# Patient Record
Sex: FEMALE | Race: WHITE | NOT HISPANIC OR LATINO | Employment: UNEMPLOYED | URBAN - METROPOLITAN AREA
[De-identification: names, ages, dates, MRNs, and addresses within clinical notes are randomized per-mention and may not be internally consistent; named-entity substitution may affect disease eponyms.]

---

## 2023-10-03 ENCOUNTER — OFFICE VISIT (OUTPATIENT)
Dept: FAMILY MEDICINE | Facility: CLINIC | Age: 33
End: 2023-10-03

## 2023-10-03 VITALS
RESPIRATION RATE: 20 BRPM | BODY MASS INDEX: 29.02 KG/M2 | WEIGHT: 170 LBS | HEART RATE: 73 BPM | DIASTOLIC BLOOD PRESSURE: 99 MMHG | HEIGHT: 64 IN | TEMPERATURE: 97.6 F | SYSTOLIC BLOOD PRESSURE: 140 MMHG

## 2023-10-03 DIAGNOSIS — Z01.818 PRE-OPERATIVE EXAMINATION: Primary | ICD-10-CM

## 2023-10-03 DIAGNOSIS — Z76.89 HEALTH CARE HOME: ICD-10-CM

## 2023-10-03 DIAGNOSIS — Z71.89 ACP (ADVANCE CARE PLANNING): ICD-10-CM

## 2023-10-03 LAB
% GRANULOCYTES: 74.6 %
HCT VFR BLD AUTO: 46.2 % (ref 35–47)
HEMOGLOBIN: 15.1 G/DL (ref 11.7–15.7)
LYMPHOCYTES NFR BLD AUTO: 18.3 %
MCH RBC QN AUTO: 32.3 PG (ref 26–33)
MCHC RBC AUTO-ENTMCNC: 32.7 G/DL (ref 31–36)
MCV RBC AUTO: 98.7 FL (ref 78–100)
MONOCYTES NFR BLD AUTO: 7.1 %
PLATELET COUNT - QUEST: 329 10^9/L (ref 150–375)
RBC # BLD AUTO: 4.68 10*12/L (ref 3.8–5.2)
WBC # BLD AUTO: 11.2 10*9/L (ref 4–11)

## 2023-10-03 PROCEDURE — 36415 COLL VENOUS BLD VENIPUNCTURE: CPT

## 2023-10-03 PROCEDURE — 85025 COMPLETE CBC W/AUTO DIFF WBC: CPT

## 2023-10-03 PROCEDURE — 80053 COMPREHEN METABOLIC PANEL: CPT

## 2023-10-03 PROCEDURE — 99204 OFFICE O/P NEW MOD 45 MIN: CPT

## 2023-10-03 NOTE — LETTER
10/3/2023        RE: Hetal Poole  [address]  [facility] [address]        [facility]  [address]  [address]  [facility] [address]  Phone: [phone]  Fax: [phone]  Primary Provider: Rachid Aleman  Pre-op Performing Provider: RACHID ALEMAN      PREOPERATIVE EVALUATION:  Today's date: 10/3/2023    Hetal is a 32 year old female who presents for a preoperative evaluation.    Surgical Information:  Surgery/Procedure: Right foot bunion surgery   Surgery Location: [facility] surgery center   Surgeon: Dr. Sow  Surgery Date: 10/19/23  Time of Surgery: TBD  Where patient plans to recover: At home with family  Fax number for surgical facility: [phone]    Assessment & Plan  The proposed surgical procedure is considered INTERMEDIATE risk.    (Z01.818) Pre-operative examination  (primary encounter diagnosis)  Comment: Elevated blood pressure at today's visit, patient states she is typically nervous when she gets her blood pressure checked. She does not have a history of hypertension. CBC and CMP are both within normal limits. She is cleared for surgery. She will follow up for a physical after her surgery.  Plan: VENOUS COLLECTION, HEMOGRAM PLATELET DIFF         (BFP), Comprehensive Metobolic Panel (BFP)    Antiplatelet or Anticoagulation Medication Instructions:   -Patient is on no antiplatelet or anticoagulation medications. Patient was instructed to stop all NSAIDs (Aspirin, Aleve, etc) one week prior to surgery.      Additional Medication Instructions:  -Patient is on no additional chronic medications     RECOMMENDATION:  -APPROVAL GIVEN to proceed with proposed procedure.       Subjective    Hetal has had a bunion on her right foot for the past three years that has been bothering her more. She is in pain and wants to get it removed.     She does not have a history of any health problems, other than she had high blood pressure with both of her pregnancies. She  does not take any daily medications other than a multivitamin. She does not take any Aspirin or blood thinners.     She recently came here in April from [place], and will be establishing care with us here.     Visit was done with help of [place] , Janie.     HPI related to upcoming procedure:     1. No - Have you ever had a heart attack or stroke?  2. No - Have you ever had surgery on your heart or blood vessels, such as a stent, coronary (heart) bypass, or surgery on an artery in the head, neck, heart, or legs?  3. No - Do you have chest pain when you are physically active?  4. No - Do you have a history of heart failure?  5. No - Do you currently have a cold, bronchitis, or symptoms of other respiratory (head and chest) infections?   6. No - Do you have a cough, shortness of breath, or wheezing?  7. Yes - Do you or anyone in your family have a history of blood clots? Dad had a history of one blood clot that was treated. She is unsure of where the clot was.  8. No - Do you or anyone in your family have a serious bleeding problem, such as long-lasting bleeding after surgeries or cuts?  9. No - Have you ever had anemia or been told to take iron pills?   10. No - Have you had any abnormal blood loss such as black, tarry or bloody stools, or abnormal vaginal bleeding?   11. No - Have you ever had a blood transfusion?  12. Yes - Are you willing to have a blood transfusion if it is medically needed before, during, or after your surgery?  13. No - Have you or anyone in your family ever had problems with anesthesia (sedation for surgery)?  14. No - Do you have sleep apnea, excessive snoring, or daytime drowsiness?   15. No - Do you have any artifical heart valves or other implanted medical devices, such as a pacemaker, defibrillator, or continuous glucose monitor?  16. No - Do you have any artifical joints?  17. No - Are you allergic to latex?  18. No - Is there any chance that you may be pregnant? LMP was on  09/15/2023.    Health Care Directive:  Patient does not have a Health Care Directive or Living Will: Discussed advance care planning with patient; however, patient declined at this time.    Preoperative Review of :   reviewed - no record of controlled substances prescribed.      Review of Systems  CONSTITUTIONAL: NEGATIVE for fever, chills, change in weight  INTEGUMENTARY/SKIN: NEGATIVE for worrisome rashes, moles or lesions  EYES: NEGATIVE for vision changes or irritation  ENT/MOUTH: NEGATIVE for ear, mouth and throat problems  RESP: NEGATIVE for significant cough or SOB  CV: NEGATIVE for chest pain, palpitations or peripheral edema  GI: NEGATIVE for nausea, abdominal pain, heartburn, or change in bowel habits  : NEGATIVE for frequency, dysuria, or hematuria  MUSCULOSKELETAL: NEGATIVE for significant arthralgias or myalgia  NEURO: NEGATIVE for weakness, dizziness or paresthesias  ENDOCRINE: NEGATIVE for temperature intolerance, skin/hair changes  HEME: NEGATIVE for bleeding problems  PSYCHIATRIC: NEGATIVE for changes in mood or affect    Patient Active Problem List    Diagnosis Date Noted     ACP (advance care planning) 10/03/2023     Priority: Medium     Health Care Home 10/03/2023     Priority: Medium      History reviewed. No pertinent past medical history.  Past Surgical History:   Procedure Laterality Date      SECTION  03/15/2016    Had elevated blood pressure during her pregnancy      SECTION N/A 2019    Had elevated blood pressure during her pregnancy     No current outpatient medications on file.       No Known Allergies     Social History     Tobacco Use     Smoking status: Never     Smokeless tobacco: Never   Substance Use Topics     Alcohol use: Not Currently     No family history on file.  History   Drug Use Not on file         Objective    BP (!) 140/99 (BP Location: Left arm, Patient Position: Sitting, Cuff Size: Adult Large)   Pulse 73   Temp 97.6  F (36.4  C)  "(Temporal)   Resp 20   Ht 1.626 m (5' 4\")   Wt 77.1 kg (170 lb)   LMP 09/15/2023 (Exact Date)   BMI 29.18 kg/m      Physical Exam  GENERAL APPEARANCE: healthy, alert and no distress.  EYES: EOMI, PERRL.  HENT: Ear canals and TM's normal and nose and mouth without ulcers or lesions.  NECK: No adenopathy, no asymmetry, masses, or scars and thyroid normal to palpation.  RESP: Lungs clear to auscultation - no rales, rhonchi or wheezes.  CV: Regular rates and rhythm, normal S1 S2, no S3 or S4 and no murmur, click or rub.  ABDOMEN:  Soft, nontender, no HSM or masses and bowel sounds normal.  MS: Extremities normal- no gross deformities noted, no evidence of inflammation in joints, FROM in all extremities.  SKIN: No suspicious lesions or rashes  NEURO: Normal strength and tone, sensory exam grossly normal, mentation intact and speech normal  PSYCH: Mentation appears normal. and affect normal/bright  LYMPHATICS: No cervical adenopathy    Diagnostics:  Recent Results (from the past 24 hour(s))   HEMOGRAM PLATELET DIFF (BFP)    Collection Time: 10/03/23 12:07 PM   Result Value Ref Range    WBC 11.2 (A) 4.0 - 11 10*9/L    RBC Count 4.68 3.8 - 5.2 10*12/L    Hemoglobin 15.1 11.7 - 15.7 g/dL    Hematocrit 46.2 35.0 - 47.0 %    MCV 98.7 78 - 100 fL    MCH 32.3 26 - 33 pg    MCHC 32.7 31 - 36 g/dL    Platelet Count 329 150 - 375 10^9/L    % Granulocytes 74.6 %    % Lymphocytes 18.3 %    % Monocytes 7.1 %      CMP reviewed and is within normal limits.     No EKG required, no history of coronary heart disease, significant arrhythmia, peripheral arterial disease or other structural heart disease.    Revised Cardiac Risk Index (RCRI):  The patient has the following serious cardiovascular risks for perioperative complications:   - No serious cardiac risks = 0 points     RCRI Interpretation: 0 points: Class I (very low risk - 0.4% complication rate)       Signed Electronically by: Cindy Aleman PA-C  Copy of this evaluation " report is provided to requesting physician.            Sincerely,        Cindy Aleman PA-C

## 2023-10-03 NOTE — NURSING NOTE
Chief Complaint   Patient presents with    New Patient     New patient to this clinic     Pre-Op Exam     DOS 10/19/23, gardenia surgery of right foot being done by Dr. Sow with [facility], surgery being held at [facility] surgery [place]

## 2023-10-03 NOTE — PROGRESS NOTES
[facility]  [address]  [address]  [facility] [address]  Phone: [phone]  Fax: [phone]  Primary Provider: Rachid Aleman  Pre-op Performing Provider: RACHID ALEMAN      PREOPERATIVE EVALUATION:  Today's date: 10/3/2023    Hetal is a 32 year old female who presents for a preoperative evaluation.    Surgical Information:  Surgery/Procedure: Right foot bunion surgery   Surgery Location: [facility] surgery center   Surgeon: Dr. Sow  Surgery Date: 10/19/23  Time of Surgery: TBD  Where patient plans to recover: At home with family  Fax number for surgical facility: [phone]    Assessment & Plan  The proposed surgical procedure is considered INTERMEDIATE risk.    (Z01.818) Pre-operative examination  (primary encounter diagnosis)  Comment: Elevated blood pressure at today's visit, patient states she is typically nervous when she gets her blood pressure checked. She does not have a history of hypertension. CBC and CMP are both within normal limits. She is cleared for surgery. She will follow up for a physical after her surgery.  Plan: VENOUS COLLECTION, HEMOGRAM PLATELET DIFF         (BFP), Comprehensive Metobolic Panel (BFP)    Antiplatelet or Anticoagulation Medication Instructions:   -Patient is on no antiplatelet or anticoagulation medications. Patient was instructed to stop all NSAIDs (Aspirin, Aleve, etc) one week prior to surgery.      Additional Medication Instructions:  -Patient is on no additional chronic medications     RECOMMENDATION:  -APPROVAL GIVEN to proceed with proposed procedure.       Subjective     Hetal has had a bunion on her right foot for the past three years that has been bothering her more. She is in pain and wants to get it removed.     She does not have a history of any health problems, other than she had high blood pressure with both of her pregnancies. She does not take any daily medications other than a multivitamin. She does not take any  Aspirin or blood thinners.     She recently came here in April from [place], and will be establishing care with us here.     Visit was done with help of [place] , Janie.     HPI related to upcoming procedure:     1. No - Have you ever had a heart attack or stroke?  2. No - Have you ever had surgery on your heart or blood vessels, such as a stent, coronary (heart) bypass, or surgery on an artery in the head, neck, heart, or legs?  3. No - Do you have chest pain when you are physically active?  4. No - Do you have a history of heart failure?  5. No - Do you currently have a cold, bronchitis, or symptoms of other respiratory (head and chest) infections?   6. No - Do you have a cough, shortness of breath, or wheezing?  7. Yes - Do you or anyone in your family have a history of blood clots? Dad had a history of one blood clot that was treated. She is unsure of where the clot was.  8. No - Do you or anyone in your family have a serious bleeding problem, such as long-lasting bleeding after surgeries or cuts?  9. No - Have you ever had anemia or been told to take iron pills?   10. No - Have you had any abnormal blood loss such as black, tarry or bloody stools, or abnormal vaginal bleeding?   11. No - Have you ever had a blood transfusion?  12. Yes - Are you willing to have a blood transfusion if it is medically needed before, during, or after your surgery?  13. No - Have you or anyone in your family ever had problems with anesthesia (sedation for surgery)?  14. No - Do you have sleep apnea, excessive snoring, or daytime drowsiness?   15. No - Do you have any artifical heart valves or other implanted medical devices, such as a pacemaker, defibrillator, or continuous glucose monitor?  16. No - Do you have any artifical joints?  17. No - Are you allergic to latex?  18. No - Is there any chance that you may be pregnant? LMP was on 09/15/2023.    Health Care Directive:  Patient does not have a Health Care Directive  "or Living Will: Discussed advance care planning with patient; however, patient declined at this time.    Preoperative Review of :   reviewed - no record of controlled substances prescribed.      Review of Systems  CONSTITUTIONAL: NEGATIVE for fever, chills, change in weight  INTEGUMENTARY/SKIN: NEGATIVE for worrisome rashes, moles or lesions  EYES: NEGATIVE for vision changes or irritation  ENT/MOUTH: NEGATIVE for ear, mouth and throat problems  RESP: NEGATIVE for significant cough or SOB  CV: NEGATIVE for chest pain, palpitations or peripheral edema  GI: NEGATIVE for nausea, abdominal pain, heartburn, or change in bowel habits  : NEGATIVE for frequency, dysuria, or hematuria  MUSCULOSKELETAL: NEGATIVE for significant arthralgias or myalgia  NEURO: NEGATIVE for weakness, dizziness or paresthesias  ENDOCRINE: NEGATIVE for temperature intolerance, skin/hair changes  HEME: NEGATIVE for bleeding problems  PSYCHIATRIC: NEGATIVE for changes in mood or affect    Patient Active Problem List    Diagnosis Date Noted    ACP (advance care planning) 10/03/2023     Priority: Medium    Health Care Home 10/03/2023     Priority: Medium      History reviewed. No pertinent past medical history.  Past Surgical History:   Procedure Laterality Date     SECTION  03/15/2016    Had elevated blood pressure during her pregnancy     SECTION N/A 2019    Had elevated blood pressure during her pregnancy     No current outpatient medications on file.       No Known Allergies     Social History     Tobacco Use    Smoking status: Never    Smokeless tobacco: Never   Substance Use Topics    Alcohol use: Not Currently     No family history on file.  History   Drug Use Not on file         Objective     BP (!) 140/99 (BP Location: Left arm, Patient Position: Sitting, Cuff Size: Adult Large)   Pulse 73   Temp 97.6  F (36.4  C) (Temporal)   Resp 20   Ht 1.626 m (5' 4\")   Wt 77.1 kg (170 lb)   LMP 09/15/2023 (Exact Date)  "  BMI 29.18 kg/m      Physical Exam  GENERAL APPEARANCE: healthy, alert and no distress.  EYES: EOMI, PERRL.  HENT: Ear canals and TM's normal and nose and mouth without ulcers or lesions.  NECK: No adenopathy, no asymmetry, masses, or scars and thyroid normal to palpation.  RESP: Lungs clear to auscultation - no rales, rhonchi or wheezes.  CV: Regular rates and rhythm, normal S1 S2, no S3 or S4 and no murmur, click or rub.  ABDOMEN:  Soft, nontender, no HSM or masses and bowel sounds normal.  MS: Extremities normal- no gross deformities noted, no evidence of inflammation in joints, FROM in all extremities.  SKIN: No suspicious lesions or rashes  NEURO: Normal strength and tone, sensory exam grossly normal, mentation intact and speech normal  PSYCH: Mentation appears normal. and affect normal/bright  LYMPHATICS: No cervical adenopathy    Diagnostics:  Recent Results (from the past 24 hour(s))   HEMOGRAM PLATELET DIFF (BFP)    Collection Time: 10/03/23 12:07 PM   Result Value Ref Range    WBC 11.2 (A) 4.0 - 11 10*9/L    RBC Count 4.68 3.8 - 5.2 10*12/L    Hemoglobin 15.1 11.7 - 15.7 g/dL    Hematocrit 46.2 35.0 - 47.0 %    MCV 98.7 78 - 100 fL    MCH 32.3 26 - 33 pg    MCHC 32.7 31 - 36 g/dL    Platelet Count 329 150 - 375 10^9/L    % Granulocytes 74.6 %    % Lymphocytes 18.3 %    % Monocytes 7.1 %      CMP reviewed and is within normal limits.     No EKG required, no history of coronary heart disease, significant arrhythmia, peripheral arterial disease or other structural heart disease.    Revised Cardiac Risk Index (RCRI):  The patient has the following serious cardiovascular risks for perioperative complications:   - No serious cardiac risks = 0 points     RCRI Interpretation: 0 points: Class I (very low risk - 0.4% complication rate)       Signed Electronically by: Cindy Aleman PA-C  Copy of this evaluation report is provided to requesting physician.

## 2023-10-04 LAB
ALBUMIN SERPL-MCNC: 4.7 G/DL (ref 3.6–5.1)
ALBUMIN/GLOB SERPL: 1.5 {RATIO} (ref 1–2.5)
ALP SERPL-CCNC: 51 U/L (ref 33–130)
ALT 1742-6: 10 U/L (ref 0–32)
AST 1920-8: 11 U/L (ref 0–35)
BILIRUB SERPL-MCNC: 0.8 MG/DL (ref 0.2–1.2)
BUN SERPL-MCNC: 11 MG/DL (ref 7–25)
BUN/CREATININE RATIO: 13.6 (ref 6–32)
CALCIUM SERPL-MCNC: 10.2 MG/DL (ref 8.6–10.3)
CHLORIDE SERPLBLD-SCNC: 102.1 MMOL/L (ref 98–110)
CO2 SERPL-SCNC: 29.4 MMOL/L (ref 20–32)
CREAT SERPL-MCNC: 0.81 MG/DL (ref 0.6–1.3)
GLOBULIN, CALCULATED - QUEST: 3.2 (ref 1.9–3.7)
GLUCOSE SERPL-MCNC: 86 MG/DL (ref 60–99)
POTASSIUM SERPL-SCNC: 4.85 MMOL/L (ref 3.5–5.3)
PROT SERPL-MCNC: 7.9 G/DL (ref 6.1–8.1)
SODIUM SERPL-SCNC: 138.5 MMOL/L (ref 135–146)

## 2023-11-16 ENCOUNTER — OFFICE VISIT (OUTPATIENT)
Dept: FAMILY MEDICINE | Facility: CLINIC | Age: 33
End: 2023-11-16

## 2023-11-16 VITALS
SYSTOLIC BLOOD PRESSURE: 144 MMHG | BODY MASS INDEX: 29.02 KG/M2 | DIASTOLIC BLOOD PRESSURE: 98 MMHG | HEIGHT: 64 IN | TEMPERATURE: 98.3 F | WEIGHT: 170 LBS | OXYGEN SATURATION: 97 % | HEART RATE: 93 BPM

## 2023-11-16 DIAGNOSIS — I10 ESSENTIAL HYPERTENSION, BENIGN: Primary | ICD-10-CM

## 2023-11-16 DIAGNOSIS — R00.2 HEART PALPITATIONS: ICD-10-CM

## 2023-11-16 DIAGNOSIS — Z87.59 HISTORY OF PRE-ECLAMPSIA: ICD-10-CM

## 2023-11-16 PROCEDURE — 99213 OFFICE O/P EST LOW 20 MIN: CPT

## 2023-11-16 RX ORDER — LOSARTAN POTASSIUM AND HYDROCHLOROTHIAZIDE 12.5; 5 MG/1; MG/1
1 TABLET ORAL DAILY
Qty: 30 TABLET | Refills: 0 | Status: SHIPPED | OUTPATIENT
Start: 2023-11-16 | End: 2024-01-04

## 2023-11-16 RX ORDER — AMLODIPINE BESYLATE 5 MG/1
5 TABLET ORAL DAILY
COMMUNITY
End: 2023-11-16

## 2023-11-16 RX ORDER — ENALAPRIL MALEATE 10 MG/1
10 TABLET ORAL DAILY
COMMUNITY
End: 2023-11-16

## 2023-11-16 NOTE — NURSING NOTE
"Chief Complaint   Patient presents with    Hypertension     Recheck BP, has been checking BP at home and it has been up to 160/110's, when BP is high she feels \"flush\"     Pre-visit Screening:  Immunizations:  not up to date- tdap unknown  Colonoscopy:  NA  Mammogram: NA  Asthma Action Test/Plan:  NA  PHQ9:  NA  GAD7:  NA  Questioned patient about current smoking habits Pt. has never smoked.  Ok to leave detailed message on voice mail for today's visit only Yes, phone # [phone]    "

## 2023-11-16 NOTE — PROGRESS NOTES
"   SUBJECTIVE:   Hetal is a 33 year old, presenting for the following:  No chief complaint on file.      HPI    Hetal presents for a yearly physical. She does not have any concerns.     Past medical history and daily medications reviewed. Reviewed past medical history, surgical history, family history, and social history below.     Social history:  -Diet:    -Water:   -Exercise:   -Sleep:   -Daily vitamins:   -Dentist:   -Eye doctor:   -Smoking:   -Alcohol:   -LMP:   -Sexual activity:      Screenings:  -Immunizations: Due for flu and TDAP. Encouraged COVID booster from local pharmacy.   -Lab work: CBC, CMP, TSH, T4, lipid, hep C screening, HIV  -Pap smear: Due.  -Mammogram: Starting at age 40.     {Add if <65 person on Medicare  - Required Questions (Optional):693913}  {Outside tests to abstract? (Optional):831832}    {additional problems to add (Optional):393982}      Social History     Tobacco Use     Smoking status: Never     Smokeless tobacco: Never   Substance Use Topics     Alcohol use: Not Currently     {Rooming staff  Click this link to complete the Prescreen if response below is not for today's visit  Alcohol Use Prescreen >3 drinks/day or > 7 drinks/week.  If the prescreen question answer is YES, complete the full AUDIT  :066038}         No data to display            {add AUDIT responses (Optional) (A score of 7 for adult men is an indication of hazardous drinking; a score of 8 or more is an indication of an alcohol use disorder.  A score of 7 or more for adult women is an indication of hazardous drinking or an alchohol use disorder):698348}  Reviewed orders with patient.  Reviewed health maintenance and updated orders accordingly - { :740270::\"Yes\"}  {Chronicprobdata (optional):050440}    Breast Cancer Screening:    FHS-7:        No data to display              {If any of the questions to the BCRA (FHS-7) are answered yes, consider ordering referral for genetic counseling (Optional) :371223::\"click " "delete button to remove this line now\"}  {AMB Mammogram Decision Support (Optional) :091153}  Pertinent mammograms are reviewed under the imaging tab.    History of abnormal Pap smear: { :297926}     Reviewed and updated as needed this visit by clinical staff                  Reviewed and updated as needed this visit by Provider                 {HISTORY OPTIONS (Optional):938391}    Review of Systems  {FEMALE ROS (Optional):650034}     OBJECTIVE:   LMP 09/15/2023 (Exact Date)   Physical Exam  {Exam Choices (Optional):091966}    {Diagnostic Test Results (Optional):196437::\"Diagnostic Test Results:\",\"Labs reviewed in Epic\"}    ASSESSMENT/PLAN:   {Diag Picklist:273909}    {Patient advised of split billing (Optional):557942}      COUNSELING:  {FEMALE COUNSELING MESSAGES:354527::\"Reviewed preventive health counseling, as reflected in patient instructions\"}      BMI:   Estimated body mass index is 29.18 kg/m  as calculated from the following:    Height as of 10/3/23: 1.626 m (5' 4\").    Weight as of 10/3/23: 77.1 kg (170 lb).   {Weight Management Plan needed for ACO:670266}      She reports that she has never smoked. She has never used smokeless tobacco.      {Counseling Resources  [place] Preventive Services Task Force  Cholesterol Screening  Health diet/nutrition  Pooled Cohorts Equation Calculator  USDA's MyPlate  ASA Prophylaxis  Lung CA Screening  Osteoporosis prevention/bone health :040096}  {Breast Cancer Risk Calculator  BRCA-Related Cancer Risk Assessment FHS-7 Tool :390723}  Cindy Aleman PA-C  [facility]  "

## 2023-11-16 NOTE — PROGRESS NOTES
"Assessment & Plan     1. Essential hypertension, benign  - Will plan on her stopping the Enalapril and Amlodipine since no benefit and I am not quite sure if these are the same ingredients as the prescription strength. We will have her try Hyzaar 50-12.5 mg daily daily for the next two weeks and follow up in clinic for an office visit. She was instructed to check her blood pressure daily for the next two weeks and keep a log of these values. Side effects discussed. Continue to follow a low salt diet and focus on healthy eating habits and daily exercise. Return to clinic and ER precautions discussed.   - losartan-hydrochlorothiazide (HYZAAR) 50-12.5 MG tablet; Take 1 tablet by mouth daily  Dispense: 30 tablet; Refill: 0    2. Heart palpitations  - Will plan on ordering a zio patch and thyroid lab work at her next visit as this needs to be worked up however would like to focus on getting better control of her blood pressure.     3. History of pre-eclampsia    Follow up in 2-4 weeks to recheck blood pressure. Reasons to follow-up sooner or seek emergent care reviewed.     Cindy Aleman PA-C  [facility]       Subjective     Hetal Poole is a 33 year old female who presents to clinic today for the following health issues:    HPI   Chief Complaint   Patient presents with     Hypertension     Recheck BP, has been checking BP at home and it has been up to 160/110's, when BP is high she feels \"flush\"      Hetal presents for blood pressure concerns. Notes she has had a history of elevated blood pressure since both of her pregnancies (, 2019). She notes she had to get a  with both of those pregnancies due to her blood pressure. She recently moved here from [place] in April and over there was started on Enalapril 10 mg in the AM and Amlodipine 5 mg in the evenings. She notes she took these medications on a as needed basis when she felt her blood pressure was high. Since moving here, she has " "been taking these medications daily and has been picking them up without a prescription from the [facility] however she notes they are not helping. She has been checking her blood pressure a couple of times a week and is getting values in the 160's/110's. When this happens she also notes her face is flushed and warm and she gets headaches. She denies any chest pain, shortness of breath, dizziness, or swelling in her legs.     She also gets intermittent palpitations but does not check her blood pressure when she gets these. She has had an ultrasound of her thyroid in the past but it did not show any abnormalities.     All other ROS are negative unless otherwise noted above.     An  (Janie) was present during today's visit.         Objective    BP (!) 144/98 (BP Location: Right arm, Patient Position: Sitting, Cuff Size: Adult Large)   Pulse 93   Temp 98.3  F (36.8  C) (Temporal)   Ht 1.626 m (5' 4\")   Wt 77.1 kg (170 lb)   LMP 09/15/2023 (Exact Date)   SpO2 97%   BMI 29.18 kg/m    Body mass index is 29.18 kg/m .    Physical Exam:  GENERAL: healthy, alert and no distress  EYES: Eyes grossly normal to inspection, PERRL and conjunctivae and sclerae normal  MOUTH: without ulcers or lesions  NECK: no adenopathy, no asymmetry, masses, or scars and thyroid normal to palpation  RESP: lungs clear to auscultation - no rales, rhonchi or wheezes  CV: regular rate and rhythm, normal S1 S2, no S3 or S4, no murmur, click or rub, no peripheral edema and peripheral pulses strong  ABDOMEN: soft, nontender, no hepatosplenomegaly, no masses and bowel sounds normal  MS: no gross musculoskeletal defects noted, no edema  SKIN: no suspicious lesions or rashes  NEURO: Normal strength and tone, mentation intact and speech normal  PSYCH: mentation appears normal, affect normal/bright  "

## 2024-01-04 ENCOUNTER — OFFICE VISIT (OUTPATIENT)
Dept: FAMILY MEDICINE | Facility: CLINIC | Age: 34
End: 2024-01-04

## 2024-01-04 VITALS
OXYGEN SATURATION: 97 % | DIASTOLIC BLOOD PRESSURE: 92 MMHG | SYSTOLIC BLOOD PRESSURE: 138 MMHG | BODY MASS INDEX: 28.34 KG/M2 | HEIGHT: 64 IN | WEIGHT: 166 LBS | TEMPERATURE: 97.9 F | HEART RATE: 67 BPM

## 2024-01-04 DIAGNOSIS — Z01.818 PRE-OPERATIVE EXAMINATION: Primary | ICD-10-CM

## 2024-01-04 DIAGNOSIS — M20.12 HALLUX VALGUS (ACQUIRED), LEFT FOOT: ICD-10-CM

## 2024-01-04 DIAGNOSIS — I10 ESSENTIAL HYPERTENSION, BENIGN: ICD-10-CM

## 2024-01-04 LAB
ALBUMIN SERPL-MCNC: 4.3 G/DL (ref 3.6–5.1)
ALBUMIN/GLOB SERPL: 1.2 {RATIO} (ref 1–2.5)
ALP SERPL-CCNC: 59 U/L (ref 33–130)
ALT 1742-6: 12 U/L (ref 0–32)
AST 1920-8: 11 U/L (ref 0–35)
BILIRUB SERPL-MCNC: 0.9 MG/DL (ref 0.2–1.2)
BUN SERPL-MCNC: 9 MG/DL (ref 7–25)
BUN/CREATININE RATIO: 11.5 (ref 6–32)
CALCIUM SERPL-MCNC: 9.4 MG/DL (ref 8.6–10.3)
CHLORIDE SERPLBLD-SCNC: 104.5 MMOL/L (ref 98–110)
CO2 SERPL-SCNC: 30.6 MMOL/L (ref 20–32)
CREAT SERPL-MCNC: 0.78 MG/DL (ref 0.6–1.3)
ERYTHROCYTE [DISTWIDTH] IN BLOOD BY AUTOMATED COUNT: 12.3 %
GLOBULIN, CALCULATED - QUEST: 3.2 (ref 1.9–3.7)
GLUCOSE SERPL-MCNC: 89 MG/DL (ref 60–99)
HCT VFR BLD AUTO: 42 % (ref 35–47)
HEMOGLOBIN: 13.9 G/DL (ref 11.7–15.7)
MCH RBC QN AUTO: 31.8 PG (ref 26–33)
MCHC RBC AUTO-ENTMCNC: 33.1 G/DL (ref 31–36)
MCV RBC AUTO: 96.2 FL (ref 78–100)
PLATELET COUNT - QUEST: 411 10^9/L (ref 150–375)
POTASSIUM SERPL-SCNC: 4.62 MMOL/L (ref 3.5–5.3)
PROT SERPL-MCNC: 7.8 G/DL (ref 6.1–8.1)
RBC # BLD AUTO: 4.37 10*12/L (ref 3.8–5.2)
SODIUM SERPL-SCNC: 140.8 MMOL/L (ref 135–146)
WBC # BLD AUTO: 9.4 10*9/L (ref 4–11)

## 2024-01-04 PROCEDURE — 99214 OFFICE O/P EST MOD 30 MIN: CPT

## 2024-01-04 PROCEDURE — 36415 COLL VENOUS BLD VENIPUNCTURE: CPT

## 2024-01-04 PROCEDURE — 85027 COMPLETE CBC AUTOMATED: CPT

## 2024-01-04 PROCEDURE — 80053 COMPREHEN METABOLIC PANEL: CPT

## 2024-01-04 RX ORDER — LOSARTAN POTASSIUM AND HYDROCHLOROTHIAZIDE 12.5; 5 MG/1; MG/1
1 TABLET ORAL DAILY
Qty: 40 TABLET | Refills: 0 | Status: SHIPPED | OUTPATIENT
Start: 2024-01-04 | End: 2024-02-27 | Stop reason: DRUGHIGH

## 2024-01-04 NOTE — PROGRESS NOTES
[facility] PHYSIC[facility]ANS  [address]  [address]  [facility] [address]  Phone: [phone]  Fax: [phone]  Primary Provider: Rachid Aleman  Pre-op Performing Provider: RACHID ALEMAN      PREOPERATIVE EVALUATION:  Today's date: 2024    Hetal is a 33 year old ( 89), presenting for the following:  Pre-Op Exam (Surgery/Procedure: Left foot bunion surgery/[place] [facility] [place]Surgeon: Dr. Sow/Surgery Date: 24)      Surgical Information:  Surgery/Procedure: Left foot bunion surgery  Sur[place] Location: [facility] [place]   Surgeon: Dr. Sow  Surgery Date: 24  Time of Surgery: AM  Where patient plans to recover: At home with family  Fax number for surgical facility: [phone]     Assessment & Plan     The proposed surgical procedure is considered INTERMEDIATE risk.     Pre-operative examination  - Patient is cleared for surgery. CBC and CMP are within normal limits.  - VENOUS COLLECTION  - Hemogram Platelet (BFP)  - Comprehensive Metobolic Panel (BFP)    Essential hypertension, benign  - Elevated in clinic today, but well controlled per patient. Patient will follow up for her blood pressure after the surgery.  - VENOUS COLLECTION  - Comprehensive Metobolic Panel (BFP)    Hallux valgus (acquired), left foot    Antiplatelet or Anticoagulation Medication Instructions:   -Patient is on no antiplatelet or anticoagulation medications.      Additional Medication Instructions:  -Patient was instructed to stop taking any vitamins and NSAIDS (Ibuprofen, Aleve, Aspirin) starting today, one week before the procedure. She was also instructed to not have anything to eat or drink for 12 hours before the surgery other than small sips of water. She will skip her Losartan-Hydrochlorothiazide the morning of surgery and resume this after the procedure.     RECOMMENDATION:  -APPROVAL GIVEN to proceed with proposed procedure.    Subjective     Hetal presents for a preoperative examination for  left bunion surgery on 01/11/24 with Dr. Sow at [facility].      She has a history of high blood pressure and is on Losartan-Hydrochlorothiazide 50-12.5 mg daily. She notes her BP at home is typically <140/90, denies any side effects.     Visit was done with help of [place] , Janie.     HPI related to upcoming procedure:   1. No - Have you ever had a heart attack or stroke?  2. No - Have you ever had surgery on your heart or blood vessels, such as a stent, coronary (heart) bypass, or surgery on an artery in the head, neck, heart, or legs?  3. No - Do you have chest pain when you are physically active?  4. No - Do you have a history of heart failure?  5. No - Do you currently have a cold, bronchitis, or symptoms of other respiratory (head and chest) infections?  6. No - Do you have a cough, shortness of breath, or wheezing?  7. No - Do you or anyone in your family have a history of blood clots?   8. No - Do you or anyone in your family have a serious bleeding problem, such as long-lasting bleeding after surgeries or cuts?  9. No - Have you ever had anemia or been told to take iron pills?  10. No - Have you had any abnormal blood loss such as black, tarry or bloody stools, or abnormal vaginal bleeding?  11. No - Have you ever had a blood transfusion?  12. Yes - Are you willing to have a blood transfusion if it is medically needed before, during, or after your surgery?  13. No - Have you or anyone in your family ever had problems with anesthesia (sedation for surgery)?  14. No - Do you have sleep apnea, excessive snoring, or daytime drowsiness?   15. No - Do you have any artifical heart valves or other implanted medical devices, such as a pacemaker, defibrillator, or continuous glucose monitor?  16. No - Do you have any artifical joints?  17. No - Are you allergic to latex?  18. No - Is there any chance that you may be pregnant?    Health Care Directive:  Patient does not have a  Health Care Directive or Living Will: Discussed advance care planning with patient; however, patient declined at this time.    Preoperative Review of :   reviewed - controlled substances prescribed by other outside provider(s).    Review of Systems  CONSTITUTIONAL: NEGATIVE for fever, chills, change in weight  INTEGUMENTARY/SKIN: NEGATIVE for worrisome rashes, moles or lesions  EYES: NEGATIVE for vision changes or irritation  ENT/MOUTH: NEGATIVE for ear, mouth and throat problems  RESP: NEGATIVE for significant cough or SOB  CV: NEGATIVE for chest pain, palpitations or peripheral edema  GI: NEGATIVE for nausea, abdominal pain, heartburn, or change in bowel habits  : NEGATIVE for frequency, dysuria, or hematuria  MUSCULOSKELETAL: NEGATIVE for significant arthralgias or myalgia  NEURO: NEGATIVE for weakness, dizziness or paresthesias  ENDOCRINE: NEGATIVE for temperature intolerance, skin/hair changes  HEME: NEGATIVE for bleeding problems  PSYCHIATRIC: NEGATIVE for changes in mood or affect    Patient Active Problem List    Diagnosis Date Noted     Essential hypertension, benign 2023     Priority: Medium     ACP (advance care planning) 10/03/2023     Priority: Medium     Health Care Home 10/03/2023     Priority: Medium      Past Medical History:   Diagnosis Date     History of pre-eclampsia     Noted in both of her pregnancies (, ), had c-sections     Past Surgical History:   Procedure Laterality Date     BUNIONECTOMY Right 10/19/2023      SECTION  03/15/2016    Had elevated blood pressure during her pregnancy      SECTION N/A 2019    Had elevated blood pressure during her pregnancy     Current Outpatient Medications   Medication Sig Dispense Refill     losartan-hydrochlorothiazide (HYZAAR) 50-12.5 MG tablet Take 1 tablet by mouth daily 30 tablet 0     No Known Allergies     Social History     Tobacco Use     Smoking status: Never     Passive exposure: Never     Smokeless  "tobacco: Never   Substance Use Topics     Alcohol use: Not Currently     Family History   Problem Relation Age of Onset     Chronic Obstructive Pulmonary Disease Father      History   Drug Use Not on file         Objective   BP (!) 138/92 (BP Location: Right arm, Patient Position: Sitting, Cuff Size: Adult Large)   Pulse 67   Temp 97.9  F (36.6  C) (Temporal)   Ht 1.626 m (5' 4\")   Wt 75.3 kg (166 lb)   SpO2 97%   BMI 28.49 kg/m      Physical Exam  GENERAL APPEARANCE: healthy, alert and no distress  EYES: EOMI, PERRL  HENT: ear canals and TM's normal and nose and mouth without ulcers or lesions  NECK: no adenopathy, no asymmetry, masses, or scars and thyroid normal to palpation  RESP: lungs clear to auscultation - no rales, rhonchi or wheezes  CV: regular rates and rhythm, normal S1 S2, no S3 or S4 and no murmur, click or rub  ABDOMEN:  soft, nontender, no HSM or masses and bowel sounds normal  MS: extremities normal- no gross deformities noted, no evidence of inflammation in joints, FROM in all extremities.  SKIN: no suspicious lesions or rashes  NEURO: Normal strength and tone, sensory exam grossly normal, mentation intact and speech normal  PSYCH: mentation appears normal. and affect normal/bright    Recent Labs   Lab Test 10/03/23  1207 10/03/23  0000   HGB 15.1  --      --    NA  --  138.5   POTASSIUM  --  4.85   CR  --  0.81      Diagnostics:  Results for orders placed or performed in visit on 01/04/24   Hemogram Platelet (BFP)     Status: Abnormal   Result Value Ref Range    WBC 9.4 4.0 - 11 10*9/L    RBC Count 4.37 3.8 - 5.2 10*12/L    Hemoglobin 13.9 11.7 - 15.7 g/dL    Hematocrit 42.0 35.0 - 47.0 %    MCV 96.2 78 - 100 fL    MCH 31.8 26 - 33 pg    MCHC 33.1 31 - 36 g/dL    RDW 12.3 %    Platelet Count 411 (A) 150 - 375 10^9/L   Comprehensive Metobolic Panel (BFP)     Status: None   Result Value Ref Range    Carbon Dioxide 30.6 20 - 32 mmol/L    Creatinine 0.78 0.60 - 1.30 mg/dL    Glucose 89 " 60 - 99 mg/dL    Sodium 140.8 135 - 146 mmol/L    Potassium 4.62 3.5 - 5.3 mmol/L    Chloride 104.5 98 - 110 mmol/L    Protein Total 7.8 6.1 - 8.1 g/dL    Albumin 4.3 3.6 - 5.1 g/dL    Alkaline Phosphatase 59 33 - 130 U/L    ALT 12 0 - 32 U/L    AST 11 0 - 35 U/L    Bilirubin Total 0.9 0.2 - 1.2 mg/dL    Urea Nitrogen 9 7 - 25 mg/dL    Calcium 9.4 8.6 - 10.3 mg/dL    BUN/Creatinine Ratio 11.5 6 - 32    Globulin Calculated 3.2 1.9 - 3.7    A/G Ratio 1.2 1 - 2.5      No EKG required, no history of coronary heart disease, significant arrhythmia, peripheral arterial disease or other structural heart disease.    Revised Cardiac Risk Index (RCRI):  The patient has the following serious cardiovascular risks for perioperative complications:   - No serious cardiac risks = 0 points     RCRI Interpretation: 0 points: Class I (very low risk - 0.4% complication rate)    Signed Electronically by: Cindy Aleman PA-C  Copy of this evaluation report is provided to requesting physician.

## 2024-01-04 NOTE — TELEPHONE ENCOUNTER
Pt was seen in clinic today, needs extension of her BP medication until she is able to scheduled recheck of BP.    Hetal Poole is requesting a refill of:    Pending Prescriptions:                       Disp   Refills    losartan-hydrochlorothiazide (HYZAAR) 50-*30 tab*0            Sig: Take 1 tablet by mouth daily

## 2024-01-04 NOTE — NURSING NOTE
Chief Complaint   Patient presents with    Pre-Op Exam     Surgery/Procedure: Left foot bunion surgery  Surgery Location: [facility] [place]   Surgeon: Dr. Sow  Surgery Date: 01/11/24

## 2024-01-04 NOTE — LETTER
2024        RE: Hetal Poole  [address]  [facility] [address]        [facility] PHYSICIANS  [address]  [address]  [facility] [address]  Phone: [phone]  Fax: [phone]  Primary Provider: Rachid Aleman  Pre-op Performing Provider: RACHID ALEMAN      PREOPERATIVE EVALUATION:  Today's date: 2024    Hetal is a 33 year old ( 89), presenting for the following:  Pre-Op Exam (Surgery/Procedure: Left foot bunion surgery/Surgery Location: [facility] [place] /Surgeon: Dr. Sow/Surgery Date: 24)      Surgical Information:  Surgery/Procedure: Left foot bunion surgery  Surgery Location: [facility] [place]   Surgeon: Dr. Sow  Surgery Date: 24  Time of Surgery: AM  Where patient plans to recover: At home with family  Fax number for surgical facility: [phone]     Assessment & Plan     The proposed surgical procedure is considered INTERMEDIATE risk.     Pre-operative examination  - Patient is cleared for surgery. CBC and CMP are within normal limits.  - VENOUS COLLECTION  - Hemogram Platelet (BFP)  - Comprehensive Metobolic Panel (BFP)    Essential hypertension, benign  - Elevated in clinic today, but well controlled per patient. Patient will follow up for her blood pressure after the surgery.  - VENOUS COLLECTION  - Comprehensive Metobolic Panel (BFP)    Hallux valgus (acquired), left foot    Antiplatelet or Anticoagulation Medication Instructions:   -Patient is on no antiplatelet or anticoagulation medications.      Additional Medication Instructions:  -Patient was instructed to stop taking any vitamins and NSAIDS (Ibuprofen, Aleve, Aspirin) starting today, one week before the procedure. She was also instructed to not have anything to eat or drink for 12 hours before the surgery other than small sips of water. She will skip her Losartan-Hydrochlorothiazide the morning of surgery and resume this after the procedure.     RECOMMENDATION:  -APPROVAL GIVEN to proceed with  proposed procedure.    Valentino Fong presents for a preoperative examination for left bunion surgery on 01/11/24 with Dr. Sow at [facility].      She has a history of high blood pressure and is on Losartan-Hydrochlorothiazide 50-12.5 mg daily. She notes her BP at home is typically <140/90, denies any side effects.     Visit was done with help of [place] , Janie.     HPI related to upcoming procedure:   1. No - Have you ever had a heart attack or stroke?  2. No - Have you ever had surgery on your heart or blood vessels, such as a stent, coronary (heart) bypass, or surgery on an artery in the head, neck, heart, or legs?  3. No - Do you have chest pain when you are physically active?  4. No - Do you have a history of heart failure?  5. No - Do you currently have a cold, bronchitis, or symptoms of other respiratory (head and chest) infections?  6. No - Do you have a cough, shortness of breath, or wheezing?  7. No - Do you or anyone in your family have a history of blood clots?   8. No - Do you or anyone in your family have a serious bleeding problem, such as long-lasting bleeding after surgeries or cuts?  9. No - Have you ever had anemia or been told to take iron pills?  10. No - Have you had any abnormal blood loss such as black, tarry or bloody stools, or abnormal vaginal bleeding?  11. No - Have you ever had a blood transfusion?  12. Yes - Are you willing to have a blood transfusion if it is medically needed before, during, or after your surgery?  13. No - Have you or anyone in your family ever had problems with anesthesia (sedation for surgery)?  14. No - Do you have sleep apnea, excessive snoring, or daytime drowsiness?   15. No - Do you have any artifical heart valves or other implanted medical devices, such as a pacemaker, defibrillator, or continuous glucose monitor?  16. No - Do you have any artifical joints?  17. No - Are you allergic to latex?  18. No - Is there  any chance that you may be pregnant?    Health Care Directive:  Patient does not have a Health Care Directive or Living Will: Discussed advance care planning with patient; however, patient declined at this time.    Preoperative Review of :   reviewed - controlled substances prescribed by other outside provider(s).    Review of Systems  CONSTITUTIONAL: NEGATIVE for fever, chills, change in weight  INTEGUMENTARY/SKIN: NEGATIVE for worrisome rashes, moles or lesions  EYES: NEGATIVE for vision changes or irritation  ENT/MOUTH: NEGATIVE for ear, mouth and throat problems  RESP: NEGATIVE for significant cough or SOB  CV: NEGATIVE for chest pain, palpitations or peripheral edema  GI: NEGATIVE for nausea, abdominal pain, heartburn, or change in bowel habits  : NEGATIVE for frequency, dysuria, or hematuria  MUSCULOSKELETAL: NEGATIVE for significant arthralgias or myalgia  NEURO: NEGATIVE for weakness, dizziness or paresthesias  ENDOCRINE: NEGATIVE for temperature intolerance, skin/hair changes  HEME: NEGATIVE for bleeding problems  PSYCHIATRIC: NEGATIVE for changes in mood or affect    Patient Active Problem List    Diagnosis Date Noted     Essential hypertension, benign 2023     Priority: Medium     ACP (advance care planning) 10/03/2023     Priority: Medium     Health Care Home 10/03/2023     Priority: Medium      Past Medical History:   Diagnosis Date     History of pre-eclampsia 2016    Noted in both of her pregnancies (, 2019), had c-sections     Past Surgical History:   Procedure Laterality Date     BUNIONECTOMY Right 10/19/2023      SECTION  03/15/2016    Had elevated blood pressure during her pregnancy      SECTION N/A 2019    Had elevated blood pressure during her pregnancy     Current Outpatient Medications   Medication Sig Dispense Refill     losartan-hydrochlorothiazide (HYZAAR) 50-12.5 MG tablet Take 1 tablet by mouth daily 30 tablet 0     No Known Allergies     Social  "History     Tobacco Use     Smoking status: Never     Passive exposure: Never     Smokeless tobacco: Never   Substance Use Topics     Alcohol use: Not Currently     Family History   Problem Relation Age of Onset     Chronic Obstructive Pulmonary Disease Father      History   Drug Use Not on file        Objective   BP (!) 138/92 (BP Location: Right arm, Patient Position: Sitting, Cuff Size: Adult Large)   Pulse 67   Temp 97.9  F (36.6  C) (Temporal)   Ht 1.626 m (5' 4\")   Wt 75.3 kg (166 lb)   SpO2 97%   BMI 28.49 kg/m      Physical Exam  GENERAL APPEARANCE: healthy, alert and no distress  EYES: EOMI, PERRL  HENT: ear canals and TM's normal and nose and mouth without ulcers or lesions  NECK: no adenopathy, no asymmetry, masses, or scars and thyroid normal to palpation  RESP: lungs clear to auscultation - no rales, rhonchi or wheezes  CV: regular rates and rhythm, normal S1 S2, no S3 or S4 and no murmur, click or rub  ABDOMEN:  soft, nontender, no HSM or masses and bowel sounds normal  MS: extremities normal- no gross deformities noted, no evidence of inflammation in joints, FROM in all extremities.  SKIN: no suspicious lesions or rashes  NEURO: Normal strength and tone, sensory exam grossly normal, mentation intact and speech normal  PSYCH: mentation appears normal. and affect normal/bright    Recent Labs   Lab Test 10/03/23  1207 10/03/23  0000   HGB 15.1  --      --    NA  --  138.5   POTASSIUM  --  4.85   CR  --  0.81      Diagnostics:  Results for orders placed or performed in visit on 01/04/24   Hemogram Platelet (BFP)     Status: Abnormal   Result Value Ref Range    WBC 9.4 4.0 - 11 10*9/L    RBC Count 4.37 3.8 - 5.2 10*12/L    Hemoglobin 13.9 11.7 - 15.7 g/dL    Hematocrit 42.0 35.0 - 47.0 %    MCV 96.2 78 - 100 fL    MCH 31.8 26 - 33 pg    MCHC 33.1 31 - 36 g/dL    RDW 12.3 %    Platelet Count 411 (A) 150 - 375 10^9/L   Comprehensive Metobolic Panel (BFP)     Status: None   Result Value Ref Range "    Carbon Dioxide 30.6 20 - 32 mmol/L    Creatinine 0.78 0.60 - 1.30 mg/dL    Glucose 89 60 - 99 mg/dL    Sodium 140.8 135 - 146 mmol/L    Potassium 4.62 3.5 - 5.3 mmol/L    Chloride 104.5 98 - 110 mmol/L    Protein Total 7.8 6.1 - 8.1 g/dL    Albumin 4.3 3.6 - 5.1 g/dL    Alkaline Phosphatase 59 33 - 130 U/L    ALT 12 0 - 32 U/L    AST 11 0 - 35 U/L    Bilirubin Total 0.9 0.2 - 1.2 mg/dL    Urea Nitrogen 9 7 - 25 mg/dL    Calcium 9.4 8.6 - 10.3 mg/dL    BUN/Creatinine Ratio 11.5 6 - 32    Globulin Calculated 3.2 1.9 - 3.7    A/G Ratio 1.2 1 - 2.5      No EKG required, no history of coronary heart disease, significant arrhythmia, peripheral arterial disease or other structural heart disease.    Revised Cardiac Risk Index (RCRI):  The patient has the following serious cardiovascular risks for perioperative complications:   - No serious cardiac risks = 0 points     RCRI Interpretation: 0 points: Class I (very low risk - 0.4% complication rate)    Signed Electronically by: Cindy Aleman PA-C  Copy of this evaluation report is provided to requesting physician.          Sincerely,        Cindy Aleman PA-C

## 2024-02-27 ENCOUNTER — OFFICE VISIT (OUTPATIENT)
Dept: FAMILY MEDICINE | Facility: CLINIC | Age: 34
End: 2024-02-27

## 2024-02-27 VITALS
HEART RATE: 83 BPM | SYSTOLIC BLOOD PRESSURE: 136 MMHG | HEIGHT: 64 IN | TEMPERATURE: 98.9 F | OXYGEN SATURATION: 99 % | WEIGHT: 171 LBS | DIASTOLIC BLOOD PRESSURE: 90 MMHG | BODY MASS INDEX: 29.19 KG/M2

## 2024-02-27 DIAGNOSIS — I10 ESSENTIAL HYPERTENSION, BENIGN: ICD-10-CM

## 2024-02-27 DIAGNOSIS — Z13.29 SCREENING FOR THYROID DISORDER: ICD-10-CM

## 2024-02-27 DIAGNOSIS — Z13.220 SCREENING FOR LIPID DISORDERS: ICD-10-CM

## 2024-02-27 DIAGNOSIS — Z23 ENCOUNTER FOR IMMUNIZATION: ICD-10-CM

## 2024-02-27 DIAGNOSIS — Z86.19 HISTORY OF COLD SORES: ICD-10-CM

## 2024-02-27 DIAGNOSIS — Z11.59 ENCOUNTER FOR HEPATITIS C SCREENING TEST FOR LOW RISK PATIENT: ICD-10-CM

## 2024-02-27 DIAGNOSIS — Z12.4 SCREENING FOR CERVICAL CANCER: ICD-10-CM

## 2024-02-27 DIAGNOSIS — Z01.419 WELL WOMAN EXAM WITH ROUTINE GYNECOLOGICAL EXAM: Primary | ICD-10-CM

## 2024-02-27 DIAGNOSIS — Z13.228 SCREENING FOR METABOLIC DISORDER: ICD-10-CM

## 2024-02-27 LAB
ALBUMIN SERPL-MCNC: 4.8 G/DL (ref 3.6–5.1)
ALBUMIN/GLOB SERPL: 1.5 {RATIO} (ref 1–2.5)
ALP SERPL-CCNC: 62 U/L (ref 33–130)
ALT 1742-6: 7 U/L (ref 0–32)
AST 1920-8: 8 U/L (ref 0–35)
BILIRUB SERPL-MCNC: 0.7 MG/DL (ref 0.2–1.2)
BUN SERPL-MCNC: 13 MG/DL (ref 7–25)
BUN/CREATININE RATIO: 17.6 (ref 6–32)
CALCIUM SERPL-MCNC: 9.5 MG/DL (ref 8.6–10.3)
CHLORIDE SERPLBLD-SCNC: 102 MMOL/L (ref 98–110)
CHOLEST SERPL-MCNC: 262 MG/DL (ref 0–199)
CHOLEST/HDLC SERPL: 4 {RATIO} (ref 0–5)
CO2 SERPL-SCNC: 28.3 MMOL/L (ref 20–32)
CREAT SERPL-MCNC: 0.74 MG/DL (ref 0.6–1.3)
GLOBULIN, CALCULATED - QUEST: 3.1 (ref 1.9–3.7)
GLUCOSE SERPL-MCNC: 87 MG/DL (ref 60–99)
HDLC SERPL-MCNC: 71 MG/DL (ref 40–150)
HEMOGLOBIN A1C: 5.4 % (ref 4–5.6)
LDLC SERPL CALC-MCNC: 154 MG/DL (ref 0–130)
POTASSIUM SERPL-SCNC: 4.32 MMOL/L (ref 3.5–5.3)
PROT SERPL-MCNC: 7.9 G/DL (ref 6.1–8.1)
SODIUM SERPL-SCNC: 139.3 MMOL/L (ref 135–146)
TRIGL SERPL-MCNC: 184 MG/DL (ref 0–149)

## 2024-02-27 PROCEDURE — 99395 PREV VISIT EST AGE 18-39: CPT | Mod: 25

## 2024-02-27 PROCEDURE — 36415 COLL VENOUS BLD VENIPUNCTURE: CPT

## 2024-02-27 PROCEDURE — 80061 LIPID PANEL: CPT

## 2024-02-27 PROCEDURE — 90471 IMMUNIZATION ADMIN: CPT

## 2024-02-27 PROCEDURE — 83036 HEMOGLOBIN GLYCOSYLATED A1C: CPT

## 2024-02-27 PROCEDURE — 90715 TDAP VACCINE 7 YRS/> IM: CPT

## 2024-02-27 PROCEDURE — 80053 COMPREHEN METABOLIC PANEL: CPT

## 2024-02-27 RX ORDER — LOSARTAN POTASSIUM AND HYDROCHLOROTHIAZIDE 12.5; 1 MG/1; MG/1
1 TABLET ORAL DAILY
Qty: 30 TABLET | Refills: 0 | Status: SHIPPED | OUTPATIENT
Start: 2024-02-27 | End: 2024-04-05 | Stop reason: DRUGHIGH

## 2024-02-27 RX ORDER — VALACYCLOVIR HYDROCHLORIDE 1 G/1
2000 TABLET, FILM COATED ORAL 2 TIMES DAILY
Qty: 20 TABLET | Refills: 0 | Status: SHIPPED | OUTPATIENT
Start: 2024-02-27

## 2024-02-27 RX ORDER — LOSARTAN POTASSIUM AND HYDROCHLOROTHIAZIDE 12.5; 5 MG/1; MG/1
1 TABLET ORAL DAILY
Qty: 90 TABLET | Refills: 1 | Status: CANCELLED | OUTPATIENT
Start: 2024-02-27

## 2024-02-27 NOTE — NURSING NOTE
Chief Complaint   Patient presents with    Physical     Fasting cpx with PAP         Pre-visit Screening:  Immunizations:  not up to date - tdap today, moved here within the last year form [place]  Colonoscopy:  NA  Mammogram: NA  Asthma Action Test/Plan:  NA  PHQ9:  NA  GAD7:  NA  Questioned patient about current smoking habits Pt. has never smoked.  Ok to leave detailed message on voice mail for today's visit only Yes, phone # [phone] Janie

## 2024-02-27 NOTE — LETTER
February 29, 2024      Hetal Poole  [address] [facility] [address]        Dear ,    We are writing to inform you of your test results.    Your test results fall within the expected range(s) or remain unchanged from previous results.  Please continue with current treatment plan.    Your CMP results which looks at your electrolytes, glucose levels, kidney function, and liver function are within normal limits.     Your cholesterol results are high. Your total cholesterol is 262 (want this <200), LDL (bad) cholesterol is 154 (want this <130), and your triglycerides are 184 (want this <150). Recommend trying to limit red meat such as beef, pork, and lamb along with processed meats such as sausage and baked goods/sweets and fried foods to help lower your cholesterol levels. For the triglycerides, I would try to limit foods that are high in trans fat, alcohol, and sugary foods and drinks. Exercising daily (30 minutes at least 5 days a week) will also help the cholesterol levels. At this time we will have you try the above lifestyle modifications and recheck these levels in a year.     Your thyroid function is normal.     Your hemoglobin A1C, which is your average blood glucose levels the last 3 months is normal at 5.4, no concerns of diabetes.     Your pap smear is normal, no concerns of cervical cancer. We will plan to repeat this test in 5 years.     You do not have hepatitis C.     Resulted Orders   Comprehensive Metobolic Panel ([facility])   Result Value Ref Range    Carbon Dioxide 28.3 20 - 32 mmol/L    Creatinine 0.74 0.60 - 1.30 mg/dL    Glucose 87 60 - 99 mg/dL    Sodium 139.3 135 - 146 mmol/L    Potassium 4.32 3.5 - 5.3 mmol/L    Chloride 102.0 98 - 110 mmol/L    Protein Total 7.9 6.1 - 8.1 g/dL    Albumin 4.8 3.6 - 5.1 g/dL    Alkaline Phosphatase 62 33 - 130 U/L    ALT 7 0 - 32 U/L    AST 8 0 - 35 U/L    Bilirubin Total 0.7 0.2 - 1.2 mg/dL    Urea Nitrogen 13 7 - 25 mg/dL    Calcium 9.5 8.6 -  10.3 mg/dL    BUN/Creatinine Ratio 17.6 6 - 32    Globulin Calculated 3.1 1.9 - 3.7    A/G Ratio 1.5 1 - 2.5   Lipid Panel (BFP)   Result Value Ref Range    Cholesterol 262 (A) 0 - 199 mg/dL    Triglycerides 184 (A) 0 - 149 mg/dL    HDL Cholesterol 71 40 - 150 mg/dL    LDL Cholesterol Direct 154 (A) 0 - 130 mg/dL    Cholesterol/HDL Ratio 4 0 - 5   TSH WITH FREE T4 REFLEX ([employer])   Result Value Ref Range    TSH 2.13 mIU/L      Comment:                Reference Range                         > or = 20 Years  0.40-4.50                              Pregnancy Ranges            First trimester    0.26-2.66            Second trimester   0.55-2.73            Third trimester    0.43-2.91     HEMOGLOBIN A1C (BFP)   Result Value Ref Range    Hemoglobin A1C 5.4 4 - 5.6 %   THINPREP TIS PAP AND HPV mRNA E6/E7 ([employer]   Result Value Ref Range    Clinical History SEE COMMENT       Comment:      PARA: 2    LMP SEE COMMENT       Comment:      2/15/2024    Last Pap Diagnosis SEE COMMENT       Comment:      2/1/2023 IN [place]    Prev Bx Dx SEE COMMENT       Comment:      2/1/2023 IN [place]    Source SEE COMMENT       Comment:      Cervix    Statement of Adequacy SEE COMMENT       Comment:      Satisfactory for evaluation.  Endocervical/transformation zone component  present.      Descriptive Diagnosis SEE COMMENT       Comment:      Cytology Results: Negative for intraepithelial  lesion or malignancy.       Comment SEE COMMENT       Comment:      This Pap test has been evaluated with computer  assisted technology.      Cytotechnologist: SEE COMMENT       Comment:      ROSA M JORGE(ASCP)  CT Screening location: [place] [address]      Comment SEE COMMENT       Comment:      EXPLANATORY NOTE:      The Pap is a screening test for cervical cancer. It is   not a diagnostic test and is subject to false negative   and false positive results. It is most reliable when a   satisfactory sample, regularly  obtained, is submitted   with relevant clinical findings and history, and when   the Pap result is evaluated along with historic and   current clinical information.         HPV mRNA E6/E7 Not Detected Not Detected      Comment:      Methodology: Transcription-Mediated Amplification     This assay detects E6/E7 viral messenger RNA (mRNA) from 14  high-risk HPV types (16,18,31,33,35,39,45,51,52,56,58,59,66,68).        Cervical sources are required for HPV testing.  If a vaginal source from a patient who has had a  total hysterectomy with removal of cervix was   submitted, please contact the testing laboratory  for alternative testing options.     For additional information, please refer to  http://education.BoardEvals/faq/PSG645f8  (This link if provided for information/  educational purposes only.)     HEPATITIS C ANTIBODY ([facility])   Result Value Ref Range    HCV Antibody NON-REACTIVE NON-REACTIVE      Comment:         HCV antibody was non-reactive. There is no laboratory   evidence of HCV infection.     In most cases, no further action is required. However,  if recent HCV exposure is suspected, a test for HCV RNA  (test code 33414) is suggested.     For additional information please refer to  http://education.S-cubism.Tibersoft/faq/INM45w3  (This link is being provided for informational/  educational purposes only.)            If you have any questions or concerns, please call the clinic at the number listed above.       Sincerely,      Cindy Aleman PA-C

## 2024-02-27 NOTE — PROGRESS NOTES
Preventive Care Visit  [facility]  Cindy Aleman PA-C, Family Medicine  Feb 27, 2024       SUBJECTIVE:   Hetal is a 33 year old, presenting for the following:  Physical (Fasting cpx with PAP)      HPI    Hetal presents for her yearly physical. She is here today with [place]  Janie to help during the visit.     Daily medications: Hetal has a history of hypertension and has been taking Losartan-Hydrochlorothiazide 50-12.5 mg daily for the last month. She notes she has been checking her blood pressure daily but forgot to bring in her list of values but notes she has been averaging around 140-146 / 75-85. She denies any side effects from the medication; no dizziness, lightheadedness, or increased urination. She also denies any symptoms of chest pain or shortness of breath.      Concerns: Hetal has two concerns today. Her first concern is she would like to have an as needed medication for cold sores as she frequently gets these to come up and would like a medication to take as needed to get rid of these, she notes she took Acyclovir PRN back in [place]. She also has concerns of her left foot. She notes she had left bunion surgery back in January this year and notes she has been having some pain and redness over her incision site. She saw her surgeon last week on 02/21/24 and was told it is not infected but feels like the pain and redness is getting worse.     Past medical history and daily medications reviewed. Reviewed past medical history, surgical history, family history, and social history below.     Social history:  -Diet: Tries to eat a well balanced diet, has chicken, pork, and fish for protein, good amount of fruits and vegetables, good amount of calcium in diet.   -Water: Drinks a good amount of water throughout the day, approximately 2 liters daily.   -Exercise: Active, does daily at home workouts and enjoys going on walks.  -Sleep: No concerns, feels well rested most  mornings.   -Daily vitamins: Multivitamin, vitamin D3.   -Dentist: Twice a year.   -Eye doctor: Has not gone yet.   -Smoking: None.   -Alcohol: Socially.   -LMP: 02/15/24, regular and manageable cycles.      Screenings:  -Immunizations: Due for TDAP and influenza, declines influenza.   -Lab work: CMP, lipid, A1C, TSH.  -Pap smear: Due, completed today.    Social History     Tobacco Use     Smoking status: Never     Passive exposure: Never     Smokeless tobacco: Never   Substance Use Topics     Alcohol use: Not Currently     Alcohol/week: 0.0 - 1.0 standard drinks of alcohol     Comment: rare, on special occasions     Reviewed orders with patient.  Reviewed health maintenance and updated orders accordingly - Yes    Lab work is in process.    Breast Cancer Screening: Any new diagnosis of family breast, ovarian, or bowel cancer? No  FHS-7:       2/27/2024     1:53 PM   Breast CA Risk Assessment (FHS-7)   Did any of your first-degree relatives have breast or ovarian cancer? No   Did any of your relatives have bilateral breast cancer? No   Did any man in your family have breast cancer? No   Did any woman in your family have breast and ovarian cancer? No   Did any woman in your family have breast cancer before age 50 y? No   Do you have 2 or more relatives with breast and/or ovarian cancer? No   Do you have 2 or more relatives with breast and/or bowel cancer? No     Mammogram Screening - Patient under 40 years of age: Routine Mammogram Screening not recommended.     History of abnormal Pap smear: NO - age 30-65 PAP every 5 years with negative HPV co-testing recommended.     Reviewed and updated as needed this visit by clinical staff   Tobacco  Allergies   Problems  Med Hx  Surg Hx  Fam Hx  Soc Hx      Reviewed and updated as needed this visit by Provider   Tobacco     Med Hx  Surg Hx  Fam Hx  Soc Hx Sexual Activity        Review of Systems  CONSTITUTIONAL: NEGATIVE for fever, chills, change in  "weight  INTEGUMENTARY/SKIN: NEGATIVE for worrisome rashes, moles or lesions  EYES: NEGATIVE for vision changes or irritation  ENT/MOUTH: NEGATIVE for ear, mouth and throat problems  RESP: NEGATIVE for significant cough or SOB  BREAST: NEGATIVE for masses, tenderness or discharge  CV: NEGATIVE for chest pain, palpitations or peripheral edema  GI: NEGATIVE for nausea, abdominal pain, heartburn, or change in bowel habits  : NEGATIVE for frequency, dysuria, or hematuria  MUSCULOSKELETAL: NEGATIVE for significant arthralgias or myalgia  NEURO: NEGATIVE for weakness, dizziness or paresthesias  ENDOCRINE: NEGATIVE for temperature intolerance, skin/hair changes  HEME: NEGATIVE for bleeding problems  PSYCHIATRIC: NEGATIVE for changes in mood or affect    OBJECTIVE:   BP (!) 136/90 (BP Location: Right arm, Patient Position: Sitting, Cuff Size: Adult Large)   Pulse 83   Temp 98.9  F (37.2  C) (Temporal)   Ht 1.626 m (5' 4\")   Wt 77.6 kg (171 lb)   LMP 02/15/2024   SpO2 99%   BMI 29.35 kg/m     Estimated body mass index is 29.35 kg/m  as calculated from the following:    Height as of this encounter: 1.626 m (5' 4\").    Weight as of this encounter: 77.6 kg (171 lb).     Physical Exam  GENERAL: alert and no distress  EYES: Eyes grossly normal to inspection, PERRL and conjunctivae and sclerae normal  HENT: ear canals and TM's normal, nose and mouth without ulcers or lesions  NECK: no adenopathy, no asymmetry, masses, or scars  RESP: lungs clear to auscultation - no rales, rhonchi or wheezes  BREAST: normal without masses, tenderness or nipple discharge and no palpable axillary masses or adenopathy  CV: regular rate and rhythm, normal S1 S2, no S3 or S4, no murmur, click or rub, no peripheral edema  ABDOMEN: soft, nontender, no hepatosplenomegaly, no masses and bowel sounds normal   (female) w/bimanual: normal female external genitalia, normal urethral meatus, normal vaginal mucosa, and normal cervix/adnexa/uterus " without masses or discharge, pap obtained  MS: no gross musculoskeletal defects noted, no edema  SKIN: slight irritation and tenderness noted surrounding incision site of base of hallux of left foot, otherwise no surrounding edema, erythema, warmth, or drainage noted  NEURO: Normal strength and tone, mentation intact and speech normal  PSYCH: mentation appears normal, affect normal/bright    Lab work pending.     ASSESSMENT/PLAN:     Well woman exam with routine gynecological exam  - Hetal is doing well today. Encouraged healthy eating habits, daily exercise, establishing care with an eye doctor, etc. We reviewed screenings and immunizations. I also discussed that incision site on her left foot looks clear of an infection. Recommend she try doing warm water soaks twice daily and continuing to keep incision site clean and dry. Follow up with surgeon if pain continues in the next couple of weeks.     Essential hypertension, benign  - Not well controlled, will plan to increase dose of Losartan-Hydrochlorothiazide to 100-12.5 mg daily for next month and have patient follow up in clinic for a recheck. Patient was instructed to check her BP daily and keep a log of values. She will let me know if she develops any side effects. CMP is pending. Return to clinic and ER precautions discussed.   - VENOUS COLLECTION  - Comprehensive Metobolic Panel (BFP)  - losartan-hydrochlorothiazide (HYZAAR) 100-12.5 MG tablet; Take 1 tablet by mouth daily for 30 days    History of cold sores  - RX for Valtrex PRN for cold sores sent.   - valACYclovir (VALTREX) 1000 mg tablet; Take 2 tablets (2,000 mg) by mouth 2 times daily For one day at onset of cold sores as needed    Encounter for immunization  - TDAP given in clinic, patient declined influenza.   - TDAP VACCINE (Adacel, Boostrix)  [[id number]]    Screening for metabolic disorder  - Lab work pending, patient will be notified of results.   - VENOUS COLLECTION  - Comprehensive Metobolic Panel  "(BFP)  - HEMOGLOBIN A1C (BFP)    Screening for lipid disorders  - Lab work pending, patient will be notified of results.   - VENOUS COLLECTION  - Lipid Panel (BFP)    Screening for thyroid disorder  - Lab work pending, patient will be notified of results.   - VENOUS COLLECTION  - TSH WITH FREE T4 REFLEX ([facility])    Encounter for hepatitis C screening test for low risk patient  - Lab work pending, patient will be notified of results.   - HEPATITIS C ANTIBODY ([facility])    Screening for cervical cancer  - Pap completed today, patient will be notified of results.   - THINPREP TIS PAP AND HPV mRNA E6/E7 ([facility])    Counseling  Reviewed preventive health counseling, as reflected in patient instructions       Regular exercise       Healthy diet/nutrition       Vision screening       Immunizations    Vaccinated for: TDAP         Alcohol Use       Contraception       Family planning       Folic Acid       Safe sex practices/STD prevention       Consider Hep C screening for all patients one time for ages 18-79 years       HIV screeninx in teen years, 1x in adult years, and at intervals if high risk    BMI  Estimated body mass index is 29.35 kg/m  as calculated from the following:    Height as of this encounter: 1.626 m (5' 4\").    Weight as of this encounter: 77.6 kg (171 lb).     Weight management plan: Discussed healthy diet and exercise guidelines    She reports that she has never smoked. She has never been exposed to tobacco smoke. She has never used smokeless tobacco.            Signed Electronically by: Cindy Aleman PA-C  "

## 2024-02-27 NOTE — PATIENT INSTRUCTIONS
Thanks for coming in today Hetal.    I will send you a letter in the mail with lab results.     Please start taking new blood pressure medication and follow up in one month.     Cold sores medications has been sent.     Follow up in one year or sooner if problems or concerns.

## 2024-02-28 LAB
HCV AB - QUEST: NORMAL
TSH SERPL-ACNC: 2.13 MIU/L

## 2024-02-29 PROBLEM — E78.2 MIXED HYPERLIPIDEMIA: Status: ACTIVE | Noted: 2024-02-29

## 2024-02-29 LAB
CLINICAL HISTORY - QUEST: NORMAL
COMMENT - QUEST: NORMAL
COMMENT - QUEST: NORMAL
CYTOTECHNOLOGIST - QUEST: NORMAL
DESCRIPTIVE DIAGNOSIS - QUEST: NORMAL
HPV MRNA E6/E7: NOT DETECTED
LAST PAP DX - QUEST: NORMAL
LMP - QUEST: NORMAL
PREV BX DX - QUEST: NORMAL
SOURCE: NORMAL
STATEMENT OF ADEQUACY - QUEST: NORMAL

## 2024-03-04 ENCOUNTER — HOSPITAL ENCOUNTER (EMERGENCY)
Facility: CLINIC | Age: 34
Discharge: HOME | End: 2024-03-04
Attending: EMERGENCY MEDICINE | Admitting: EMERGENCY MEDICINE
Payer: COMMERCIAL

## 2024-03-04 VITALS
OXYGEN SATURATION: 100 % | SYSTOLIC BLOOD PRESSURE: 130 MMHG | DIASTOLIC BLOOD PRESSURE: 77 MMHG | HEART RATE: 60 BPM | TEMPERATURE: 98.2 F | RESPIRATION RATE: 18 BRPM

## 2024-03-04 DIAGNOSIS — R11.2 NAUSEA AND VOMITING, UNSPECIFIED VOMITING TYPE: ICD-10-CM

## 2024-03-04 DIAGNOSIS — R42 VERTIGO: ICD-10-CM

## 2024-03-04 DIAGNOSIS — I10 HYPERTENSION, UNSPECIFIED TYPE: ICD-10-CM

## 2024-03-04 LAB
ALBUMIN SERPL BCG-MCNC: 4.5 G/DL (ref 3.5–5.2)
ALP SERPL-CCNC: 67 U/L (ref 40–150)
ALT SERPL W P-5'-P-CCNC: 13 U/L (ref 0–50)
ANION GAP SERPL CALCULATED.3IONS-SCNC: 11 MMOL/L (ref 7–15)
AST SERPL W P-5'-P-CCNC: 20 U/L (ref 0–45)
ATRIAL RATE - MUSE: 53 BPM
BASOPHILS # BLD AUTO: 0 10E3/UL (ref 0–0.2)
BASOPHILS NFR BLD AUTO: 0 %
BILIRUB SERPL-MCNC: 0.7 MG/DL
BUN SERPL-MCNC: 10.4 MG/DL (ref 6–20)
CALCIUM SERPL-MCNC: 9.1 MG/DL (ref 8.6–10)
CHLORIDE SERPL-SCNC: 103 MMOL/L (ref 98–107)
CREAT SERPL-MCNC: 0.71 MG/DL (ref 0.51–0.95)
DEPRECATED HCO3 PLAS-SCNC: 25 MMOL/L (ref 22–29)
DIASTOLIC BLOOD PRESSURE - MUSE: NORMAL MMHG
EGFRCR SERPLBLD CKD-EPI 2021: >90 ML/MIN/1.73M2
EOSINOPHIL # BLD AUTO: 0 10E3/UL (ref 0–0.7)
EOSINOPHIL NFR BLD AUTO: 0 %
ERYTHROCYTE [DISTWIDTH] IN BLOOD BY AUTOMATED COUNT: 13.1 % (ref 10–15)
GLUCOSE SERPL-MCNC: 112 MG/DL (ref 70–99)
HCG SER QL IA.RAPID: NEGATIVE
HCT VFR BLD AUTO: 41.1 % (ref 35–47)
HGB BLD-MCNC: 14.1 G/DL (ref 11.7–15.7)
HOLD SPECIMEN: NORMAL
HOLD SPECIMEN: NORMAL
IMM GRANULOCYTES # BLD: 0 10E3/UL
IMM GRANULOCYTES NFR BLD: 0 %
INTERPRETATION ECG - MUSE: NORMAL
LYMPHOCYTES # BLD AUTO: 2 10E3/UL (ref 0.8–5.3)
LYMPHOCYTES NFR BLD AUTO: 20 %
MCH RBC QN AUTO: 31.7 PG (ref 26.5–33)
MCHC RBC AUTO-ENTMCNC: 34.3 G/DL (ref 31.5–36.5)
MCV RBC AUTO: 92 FL (ref 78–100)
MONOCYTES # BLD AUTO: 0.3 10E3/UL (ref 0–1.3)
MONOCYTES NFR BLD AUTO: 3 %
NEUTROPHILS # BLD AUTO: 7.2 10E3/UL (ref 1.6–8.3)
NEUTROPHILS NFR BLD AUTO: 77 %
NRBC # BLD AUTO: 0 10E3/UL
NRBC BLD AUTO-RTO: 0 /100
P AXIS - MUSE: 29 DEGREES
PLATELET # BLD AUTO: 280 10E3/UL (ref 150–450)
POTASSIUM SERPL-SCNC: 4.5 MMOL/L (ref 3.4–5.3)
PR INTERVAL - MUSE: 112 MS
PROT SERPL-MCNC: 7.9 G/DL (ref 6.4–8.3)
QRS DURATION - MUSE: 86 MS
QT - MUSE: 460 MS
QTC - MUSE: 431 MS
R AXIS - MUSE: 54 DEGREES
RBC # BLD AUTO: 4.45 10E6/UL (ref 3.8–5.2)
SODIUM SERPL-SCNC: 139 MMOL/L (ref 135–145)
SYSTOLIC BLOOD PRESSURE - MUSE: NORMAL MMHG
T AXIS - MUSE: 65 DEGREES
VENTRICULAR RATE- MUSE: 53 BPM
WBC # BLD AUTO: 9.6 10E3/UL (ref 4–11)

## 2024-03-04 PROCEDURE — 99284 EMERGENCY DEPT VISIT MOD MDM: CPT | Mod: 25

## 2024-03-04 PROCEDURE — 84703 CHORIONIC GONADOTROPIN ASSAY: CPT

## 2024-03-04 PROCEDURE — 36415 COLL VENOUS BLD VENIPUNCTURE: CPT | Performed by: EMERGENCY MEDICINE

## 2024-03-04 PROCEDURE — 80053 COMPREHEN METABOLIC PANEL: CPT | Performed by: EMERGENCY MEDICINE

## 2024-03-04 PROCEDURE — 250N000011 HC RX IP 250 OP 636: Performed by: EMERGENCY MEDICINE

## 2024-03-04 PROCEDURE — 96361 HYDRATE IV INFUSION ADD-ON: CPT

## 2024-03-04 PROCEDURE — 85025 COMPLETE CBC W/AUTO DIFF WBC: CPT | Performed by: EMERGENCY MEDICINE

## 2024-03-04 PROCEDURE — 96374 THER/PROPH/DIAG INJ IV PUSH: CPT

## 2024-03-04 PROCEDURE — 258N000003 HC RX IP 258 OP 636: Performed by: EMERGENCY MEDICINE

## 2024-03-04 PROCEDURE — 93005 ELECTROCARDIOGRAM TRACING: CPT

## 2024-03-04 RX ORDER — ONDANSETRON 2 MG/ML
4 INJECTION INTRAMUSCULAR; INTRAVENOUS ONCE
Status: COMPLETED | OUTPATIENT
Start: 2024-03-04 | End: 2024-03-04

## 2024-03-04 RX ORDER — ONDANSETRON 4 MG/1
4 TABLET, ORALLY DISINTEGRATING ORAL EVERY 8 HOURS PRN
Qty: 10 TABLET | Refills: 0 | Status: SHIPPED | OUTPATIENT
Start: 2024-03-04 | End: 2024-03-07

## 2024-03-04 RX ORDER — MECLIZINE HYDROCHLORIDE 25 MG/1
25 TABLET ORAL EVERY 6 HOURS PRN
Qty: 30 TABLET | Refills: 1 | Status: SHIPPED | OUTPATIENT
Start: 2024-03-04 | End: 2024-04-05

## 2024-03-04 RX ADMIN — ONDANSETRON 4 MG: 2 INJECTION INTRAMUSCULAR; INTRAVENOUS at 12:10

## 2024-03-04 RX ADMIN — SODIUM CHLORIDE 1000 ML: 9 INJECTION, SOLUTION INTRAVENOUS at 11:45

## 2024-03-04 ASSESSMENT — ACTIVITIES OF DAILY LIVING (ADL)
ADLS_ACUITY_SCORE: 35
ADLS_ACUITY_SCORE: 35

## 2024-03-04 ASSESSMENT — COLUMBIA-SUICIDE SEVERITY RATING SCALE - C-SSRS
6. HAVE YOU EVER DONE ANYTHING, STARTED TO DO ANYTHING, OR PREPARED TO DO ANYTHING TO END YOUR LIFE?: NO
2. HAVE YOU ACTUALLY HAD ANY THOUGHTS OF KILLING YOURSELF IN THE PAST MONTH?: NO
1. IN THE PAST MONTH, HAVE YOU WISHED YOU WERE DEAD OR WISHED YOU COULD GO TO SLEEP AND NOT WAKE UP?: NO

## 2024-03-04 NOTE — ED TRIAGE NOTES
Pt comes in with her friend, friend is translating.  Per friend pt woke up this morning with vomitting and has been dizzy all morning.  She has a hx of hypertension and there is some concern that this may be the cause.  Pt is in a wheelchair as she does not feel comfortable standing at this time. She does complain of the inside of her mouth feeling numb and she had a headache last night for which she took ibuprofen, no other complaints of pain.       Triage Assessment (Adult)       Row Name 03/04/24 1049          Triage Assessment    Airway WDL WDL        Respiratory WDL    Respiratory WDL WDL        Skin Circulation/Temperature WDL    Skin Circulation/Temperature WDL WDL        Cardiac WDL    Cardiac WDL WDL        Peripheral/Neurovascular WDL    Peripheral Neurovascular WDL WDL        Cognitive/Neuro/Behavioral WDL    Cognitive/Neuro/Behavioral WDL WDL

## 2024-03-04 NOTE — ED PROVIDER NOTES
History     Chief Complaint:  Nausea & Vomiting       The history is limited by a language barrier. A  was used (Japanese; interpreted by friend).      Hetal Poole is a 33 year old female who presents with nausea and vomiting. The patient woke up at around 0400 with dizziness after turning her head. She was nauseated and vomited. She feels better when laying still. She denies hearing problems but had a slight headache. She denies one sided numbness or weakness. She denies speech changes. She denies cough, congestion, or recent illness. She denies abdominal pain or fever. She denies previous history of vertigo. She denies history of early age strokes or heart disease in her immediate family. She had 100 mg of dramamine at around 1030 this morning. She had one reading of 200/136 last night, but had consequently had lower readings in the range between 120-160 after taking her hypertension medication.    Independent Historian:    Friend - They report HPI     Review of External Notes:  Outpatient clinic notes reviewed.  Well woman gynecologic exam reviewed from 2024.  Visit for ear pain reviewed from 2024.    Medications:    losartan-hydrochlorothiazide  valacyclovir    Past Medical History:    Pre-eclampsia  Hypertension  Hyperlipidemia    Past Surgical History:    Bunionectomy      Physical Exam   Patient Vitals for the past 24 hrs:   BP Temp Temp src Pulse Resp SpO2   24 1321 130/77 -- -- 60 -- --   24 1051 (!) 142/97 98.2  F (36.8  C) Temporal 65 18 100 %      Physical Exam  General: Adult female, lying on a recliner  Eyes: PERRL, Conjunctive within normal limits.  EOMI.  No appreciable nystagmus.  ENT: Moist mucous membranes, oropharynx clear.    Neck: No rigidity.  CV: Normal S1S2, no murmur, rub or gallop. Regular rate and rhythm  Resp: Clear to auscultation bilaterally, no wheezes, rales or rhonchi. Normal respiratory effort.  GI: Abdomen is soft, nontender  and nondistended. No palpable masses. No rebound or guarding.  MSK: No edema. Nontender. Normal active range of motion.  Skin: Warm and dry. No rashes or lesions or ecchymoses on visible skin.  Neuro: Alert and oriented. Responds appropriately to all questions and commands. No focal findings appreciated. Normal muscle tone.  No facial asymmetry.  Speech is normal.  Psych: Normal mood and affect. Pleasant.     Emergency Department Course   ECG  ECG results from 03/04/24   EKG 12-lead, tracing only     Value    Systolic Blood Pressure     Diastolic Blood Pressure     Ventricular Rate 53    Atrial Rate 53    IA Interval 112    QRS Duration 86        QTc 431    P Axis 29    R AXIS 54    T Axis 65    Interpretation ECG      Sinus bradycardia  Otherwise normal ECG  No previous ECGs available       Laboratory:  Labs Ordered and Resulted from Time of ED Arrival to Time of ED Departure   COMPREHENSIVE METABOLIC PANEL - Abnormal       Result Value    Sodium 139      Potassium 4.5      Carbon Dioxide (CO2) 25      Anion Gap 11      Urea Nitrogen 10.4      Creatinine 0.71      GFR Estimate >90      Calcium 9.1      Chloride 103      Glucose 112 (*)     Alkaline Phosphatase 67      AST 20      ALT 13      Protein Total 7.9      Albumin 4.5      Bilirubin Total 0.7     ISTAT HCG QUALITATIVE PREGNANCY POCT - Normal    HCG Qualitative POCT Negative     CBC WITH PLATELETS AND DIFFERENTIAL    WBC Count 9.6      RBC Count 4.45      Hemoglobin 14.1      Hematocrit 41.1      MCV 92      MCH 31.7      MCHC 34.3      RDW 13.1      Platelet Count 280      % Neutrophils 77      % Lymphocytes 20      % Monocytes 3      % Eosinophils 0      % Basophils 0      % Immature Granulocytes 0      NRBCs per 100 WBC 0      Absolute Neutrophils 7.2      Absolute Lymphocytes 2.0      Absolute Monocytes 0.3      Absolute Eosinophils 0.0      Absolute Basophils 0.0      Absolute Immature Granulocytes 0.0      Absolute NRBCs 0.0        Emergency  Department Course & Assessments:    Interventions:  Medications   sodium chloride 0.9% BOLUS 1,000 mL (1,000 mLs Intravenous $New Bag 3/4/24 1145)      Assessments:  1200 I obtained history and examined patient.   0115 I rechecked the patient and explained findings.  She is sitting upright and appears improved.  She notes she is feeling better.  She ambulates independently to the bathroom and does well.    Independent Interpretation (X-rays, CTs, rhythm strip):  None    Consultations/Discussion of Management or Tests:  None    Social Determinants of Health affecting care:  None     Disposition:  The patient was discharged.    Impression & Plan    Medical Decision Making:  Hetal Poole is a 33 year old female who presents for evaluation of vertigo with associated nausea and vomiting.  She has no associated chest pain the differential diagnosis of vertigo is broad and includes common etiologies such as menieres disease, labyrinthitis, benign positional vertigo, otitis media, etc.  More serious etiologies considered include central etiologies such as tumor, intracerebral bleed, dissection, ischemic cerebral vascular accident.  The history, physical exam including detailed neurologic exam, and workup in the emergency room suggests a benign cause of vertigo today.  She was concerned about some elevation of blood pressure last night, however blood pressure seem controlled in the emergency department today.  As she is feeling much better, advanced imaging does not seem clinically indicated with lower suspicion for a central cause for her symptoms.  Patient feels improved after interventions noted above. Further outpatient management is indicated with vertigo medications and close follow-up with her primary care provider in the upcoming days with any ongoing symptoms.  Return precautions were discussed including severe headache, neurologic symptoms, uncontrolled vertigo/vomiting. Vertigo precautions given for home.   All  questions were answered prior to discharge.    Diagnosis:    ICD-10-CM    1. Vertigo  R42       2. Nausea and vomiting, unspecified vomiting type  R11.2       3. Hypertension, unspecified type  I10            Discharge Medications:  Discharge Medication List as of 3/4/2024  1:21 PM        START taking these medications    Details   meclizine (ANTIVERT) 25 MG tablet Take 1 tablet (25 mg) by mouth every 6 hours as needed for dizziness, Disp-30 tablet, R-1, E-Prescribe      ondansetron (ZOFRAN ODT) 4 MG ODT tab Take 1 tablet (4 mg) by mouth every 8 hours as needed for nausea or vomiting, Disp-10 tablet, R-0, E-Prescribe            Scribe Disclosure:  [employer] Hired, am serving as a scribe at 12:10 PM on 3/4/2024 to document services personally performed by Bouchra Yan MD based on my observations and the provider's statements to me.  3/4/2024   Bouchra Yan MD Jonkman, Tracy Dianne, MD  03/04/24 1427

## 2024-03-12 ENCOUNTER — OFFICE VISIT (OUTPATIENT)
Dept: FAMILY MEDICINE | Facility: CLINIC | Age: 34
End: 2024-03-12

## 2024-03-12 VITALS
HEART RATE: 83 BPM | HEIGHT: 64 IN | BODY MASS INDEX: 28.85 KG/M2 | SYSTOLIC BLOOD PRESSURE: 144 MMHG | OXYGEN SATURATION: 98 % | DIASTOLIC BLOOD PRESSURE: 102 MMHG | TEMPERATURE: 98 F | WEIGHT: 169 LBS

## 2024-03-12 DIAGNOSIS — I10 ESSENTIAL HYPERTENSION, BENIGN: ICD-10-CM

## 2024-03-12 DIAGNOSIS — R42 VERTIGO: Primary | ICD-10-CM

## 2024-03-12 PROCEDURE — 99213 OFFICE O/P EST LOW 20 MIN: CPT

## 2024-03-12 NOTE — PROGRESS NOTES
Assessment & Plan     1. Vertigo  - Discussed with Hetal vertigo has many different etiologies and it is difficult to determine at this time whether her dizziness was from her blood pressure being high as this is a more unusual symptom of high blood pressure or there is a different etiology of her dizziness such as BPPV. Her blood pressure is high today however she notes she did not sleep the best last night and currently has a cold from one of her daughters. Reassured by her normal neurological exam today. She has only been taking the increased dose of Losartan-Hydrochlorothiazide 100-12.5 mg for about one week and I discussed that it can take a couple of weeks for her blood pressure to stabilize on the increased dose. Plan will be for Hetal to continue Losartan-Hydrochlorothiaziide 100-12.5 mg daily and continue to keep a log of her BP values and return to clinic in one month for a follow up visit. I also encouraged her to drink plenty of fluids and to let me know if the dizziness worsens or she experiences any new symptoms. Return to clinic and ER precautions discussed.     2. Essential hypertension, benign  - See above.     Follow up in one month for blood pressure recheck or sooner if problems or concerns. Reasons to follow-up sooner or seek emergent care reviewed.     Cindy Aleman PA-C  [facility]       Subjective     Hetal Pooel is a 33 year old female who presents to clinic today for the following health issues:    HPI   Chief Complaint   Patient presents with     FU After ER Visit     Pt was seen on 3/4/24 at the [facility] ED for vertigo, nausea and vomiting. Since then she has experienced occasional spinning during the day and did vomit once following the day she was seen in the ED.      ED/ Followup:    Facility:  [facility] Emergency Department  Date of visit: 03/04/24  Reason for visit: vertigo, nausea, and vomitting  Current Status: Still has a spinning sensation  "during the day sometimes. Had one more day of vomiting after ED visit.    Hetal presents today with Janie (American ) for an ER follow up. Hetal went to [facility] ER on 03/04/24 for dizziness, nausea, and vomiting. She states the day before she was feeling stressed and her blood pressure was very high (around 200/136) so she took her blood pressure medication (Losartan-Hydrochlorothiazide 100-12.5 mg) and around 4 AM that night she woke up with dizziness however her blood pressure was normal (in the 120's/80's). However that morning she was feeling nauseated and vomited. She went in to the ER that day where she had normal lab work and a normal EKG. She was given anti-nausea medication and IV fluids and was discharged home. Her blood pressure was well controlled in the ER. She was prescribed Meclizine for dizziness and Zofran for nausea.     Since then, Hetal notes she still has about 2-3 one minute long episodes of dizziness throughout the day but denies any nausea or vomiting other than she vomited once the day after she was discharged from the ER. She denies any other symptoms of vision changes, SOB, chest pain, palpitations, swelling in her legs, etc. She has been checking her blood pressure at home and notes it is between 130-140 and the highest the bottom number ever is 80. She does feel as though her blood pressure is generally higher in the evenings which is usually when she feels dizzy. She does not have a history of dizziness in the past. She takes her blood pressure medication every morning. She only took Meclizine for a couple of days but is not currently taking it. She is not taking any Zofran.     Daily medications reviewed.       Objective    /86 (BP Location: Right arm, Patient Position: Sitting, Cuff Size: Adult Regular)   Pulse 83   Temp 98  F (36.7  C) (Temporal)   Ht 1.626 m (5' 4\")   Wt 76.7 kg (169 lb)   LMP 02/15/2024   SpO2 98%   BMI 29.01 kg/m    Body mass " index is 29.01 kg/m .    Physical Examination:  GENERAL: healthy, alert and no distress  EYES: Eyes grossly normal to inspection, PERRL and conjunctivae and sclerae normal  HENT: ear canals and TM's normal, nose and mouth without ulcers or lesions  NECK: no adenopathy, no asymmetry, masses, or scars and thyroid normal to palpation  RESP: lungs clear to auscultation - no rales, rhonchi or wheezes  CV: regular rate and rhythm, normal S1 S2, no S3 or S4, no murmur, click or rub, no peripheral edema   ABDOMEN: soft and non-tender  MS: no gross musculoskeletal defects noted, no edema  SKIN: no suspicious lesions or rashes  NEURO: Normal strength and tone, mentation intact and speech normal, CN II-XII grossly intact, normal heel-toe test, normal gait  PSYCH: mentation appears normal, affect normal/bright    Labs reviewed from ER visit on 03/04/24.

## 2024-03-12 NOTE — NURSING NOTE
Chief Complaint   Patient presents with    FU After ER Visit     Pt was seen on 3/4/24 at the [facility] ED for vertigo, nausea and vomiting. Since then she has experienced occasional spinning during the day and did vomit once following the day she was seen in the ED.

## 2024-04-04 PROBLEM — Z86.19 HISTORY OF COLD SORES: Status: ACTIVE | Noted: 2024-04-04

## 2024-04-05 ENCOUNTER — OFFICE VISIT (OUTPATIENT)
Dept: FAMILY MEDICINE | Facility: CLINIC | Age: 34
End: 2024-04-05

## 2024-04-05 VITALS
SYSTOLIC BLOOD PRESSURE: 148 MMHG | DIASTOLIC BLOOD PRESSURE: 84 MMHG | HEART RATE: 100 BPM | BODY MASS INDEX: 29.3 KG/M2 | TEMPERATURE: 97.5 F | WEIGHT: 171.6 LBS | HEIGHT: 64 IN | RESPIRATION RATE: 20 BRPM

## 2024-04-05 DIAGNOSIS — I10 ESSENTIAL HYPERTENSION, BENIGN: Primary | ICD-10-CM

## 2024-04-05 PROCEDURE — 99213 OFFICE O/P EST LOW 20 MIN: CPT

## 2024-04-05 RX ORDER — LOSARTAN POTASSIUM AND HYDROCHLOROTHIAZIDE 25; 100 MG/1; MG/1
1 TABLET ORAL DAILY
Qty: 30 TABLET | Refills: 0 | Status: SHIPPED | OUTPATIENT
Start: 2024-04-05 | End: 2024-04-18

## 2024-04-05 RX ORDER — LOSARTAN POTASSIUM AND HYDROCHLOROTHIAZIDE 12.5; 1 MG/1; MG/1
1 TABLET ORAL DAILY
Qty: 30 TABLET | Refills: 0 | Status: CANCELLED | OUTPATIENT
Start: 2024-04-05

## 2024-04-05 NOTE — PROGRESS NOTES
"Assessment & Plan     1. Essential hypertension, benign  - Discussed with Hetal that blood pressure is still not at goal range of <140/90. Will plan to have Hetal take Losartan-Hydrochlorothiazide 100-25 mg daily for next month and keep a log of her daily blood pressure values. Side effects reviewed. She will return in one month for a BP recheck. If BP is still elevated will plan to add on Amlodipine 5 mg. Return to clinic and ER precautions discussed.   - losartan-hydrochlorothiazide (HYZAAR) 100-25 MG tablet; Take 1 tablet by mouth daily  Dispense: 30 tablet; Refill: 0    Follow up in one month or sooner if problems or concerns. Reasons to follow-up sooner or seek emergent care reviewed.     Cindy Aleman PA-C  [facility]       Subjective     Hetal Poole is a 33 year old female who presents to clinic today for the following health issues:    HPI   Chief Complaint   Patient presents with     RECHECK      Hetal presents with Janie (Haitian ) for a follow up for her blood pressure. On her last visit on 02/27/24 we increased her blood pressure dose of Losartan-Hydrochlorothiazide to 100-12.5 mg daily. Since then Hetal has been checking her BP daily and has been getting an average range of 130-140 / 70-90. She notes she typically is still about at the 140/90 range. She denies any side effects of the medication along with any symptoms of chest pain, SOB, palpitations, or swelling in her legs. She also denies any more episodes of dizziness.     Daily medications reviewed.       Objective    BP (!) 148/84 (BP Location: Right arm, Patient Position: Chair, Cuff Size: Adult Regular)   Pulse 100   Temp 97.5  F (36.4  C) (Temporal)   Resp 20   Ht 1.626 m (5' 4\")   Wt 77.8 kg (171 lb 9.6 oz)   LMP 02/15/2024   BMI 29.46 kg/m    Body mass index is 29.46 kg/m .    Physical Examination:  GENERAL: healthy, alert and no distress  EYES: Eyes grossly normal to inspection, PERRL and " conjunctivae and sclerae normal  HENT: mouth without ulcers or lesions  NECK: no adenopathy, no asymmetry, masses, or scars and thyroid normal to palpation  RESP: lungs clear to auscultation - no rales, rhonchi or wheezes  CV: regular rate and rhythm, normal S1 S2, no S3 or S4, no murmur, click or rub, no peripheral edema   ABDOMEN: soft and non-tender  MS: no gross musculoskeletal defects noted, no edema  SKIN: no suspicious lesions or rashes  PSYCH: mentation appears normal, affect normal/bright

## 2024-04-05 NOTE — NURSING NOTE
Hetal Poole is here for a blood pressure check and medication refill.  Questioned patient about current smoking habits.  Pt. has never smoked.  Body mass index is 33.67 kg/(m^2).  PULSE regular  My Chart:     Pre-visit planning  Immunizations - up to date  Colonoscopy -   Mammogram -   Asthma -   PHQ9 -    PAIGE-7 -    The patient has verbalized that it is ok to leave a detailed voice message on the patient's cell phone with results/recommendations from this visit.

## 2024-04-18 ENCOUNTER — OFFICE VISIT (OUTPATIENT)
Dept: FAMILY MEDICINE | Facility: CLINIC | Age: 34
End: 2024-04-18

## 2024-04-18 VITALS
BODY MASS INDEX: 30.04 KG/M2 | HEART RATE: 107 BPM | SYSTOLIC BLOOD PRESSURE: 158 MMHG | RESPIRATION RATE: 20 BRPM | WEIGHT: 175 LBS | DIASTOLIC BLOOD PRESSURE: 108 MMHG | OXYGEN SATURATION: 99 % | TEMPERATURE: 98 F

## 2024-04-18 DIAGNOSIS — N93.9 VAGINAL BLEEDING: ICD-10-CM

## 2024-04-18 DIAGNOSIS — I10 ESSENTIAL HYPERTENSION, BENIGN: ICD-10-CM

## 2024-04-18 DIAGNOSIS — Z32.01 PREGNANCY TEST POSITIVE: Primary | ICD-10-CM

## 2024-04-18 DIAGNOSIS — Z87.59 HISTORY OF PRE-ECLAMPSIA: ICD-10-CM

## 2024-04-18 LAB — PREG. TEST: ABNORMAL

## 2024-04-18 PROCEDURE — 99213 OFFICE O/P EST LOW 20 MIN: CPT

## 2024-04-18 PROCEDURE — 81025 URINE PREGNANCY TEST: CPT

## 2024-04-18 NOTE — NURSING NOTE
Chief Complaint   Patient presents with    Referral     Had a positive pregnancy test at home, has been having cramping for the past 3 days now, wants referral to an OGBYN, had a period on the 6th      Pre-visit Screening:  Immunizations:  up to date  Colonoscopy:  na  Mammogram: na  Asthma Action Test/Plan:  na  PHQ9:  na  GAD7:  na  Questioned patient about current smoking habits Pt. has never smoked.  Ok to leave detailed message on voice mail for today's visit only yes, phone # [phone] (home)

## 2024-04-18 NOTE — PROGRESS NOTES
Assessment & Plan     1. Pregnancy test positive  - Positive urine pregnancy test in clinic. Hetal is scheduled to see OBGYN specialists tomorrow at 10:00 AM for a consultation and to establish care. Hetal is not currently having any vaginal bleeding or spotting and denies any symptoms of dizziness, chest pain, or SOB so should be ok to wait until her OB appointment tomorrow. We did discuss reasons to go to the ER if her symptoms change/worsen, patient demonstrated understanding.   - Urine Pregnancy Test (BFP)  - Ob/Gyn  Referral - To a Non [facility] Location (Use POS/Location)    2. Vaginal bleeding  - See above.   - Ob/Gyn  Referral - To a [facility] Location (Use POS/Location)    3. History of pre-eclampsia  - See above.     4. Essential hypertension, benign  - Agreed to stay off of Losartan-Hydrochlorothiazide. Will plan for OBGYN to start her on a new blood pressure medication depending on status of her pregnancy, otherwise she is welcome to continue to follow up with me for her blood pressure.     Follow up with OBGYN specialists tomorrow. Reasons to follow-up sooner or seek emergent care reviewed.     Cindy Aleman PA-C  [facility]       Subjective     Hetal Poole is a 33 year old female who presents to clinic today for the following health issues:    HPI   Chief Complaint   Patient presents with    Referral     Had a positive pregnancy test at home, has been having cramping for the past 3 days now, wants referral to an OGBYN, had a period on the 6th       Hetal presents today with concerns of a possible positive pregnancy test. She states on Tuesday this week (04/16/24), she experienced symptoms of abdominal cramping and some vaginal bleeding and spotting. She took an at home pregnancy test on Tuesday and it was positive and since then has taken two more which have both been positive. She has also been experiencing some breast tenderness but  denies any nausea or vomiting. She notes her LMP was 24, she has regular cycles. Hetal notes she is in a relationship and states her and her partner were not actively trying to get pregnant but were not against it either. She has two daughters that were born via . Hetal does have a history of preeclampsia with both pregnancies. She has stopped taking her Losartan-Hydrochlorothiazide blood pressure medication as knows this is not pregnancy safe.     Past medical history reviewed.       Objective    BP (!) 158/108 (BP Location: Right arm, Patient Position: Sitting, Cuff Size: Adult Large)   Pulse 107   Temp 98  F (36.7  C) (Temporal)   Resp 20   Wt 79.4 kg (175 lb)   LMP 02/15/2024   SpO2 99%   BMI 30.04 kg/m    Body mass index is 30.04 kg/m .    Physical Examination:  GENERAL: healthy, alert and no distress  EYES: Eyes grossly normal to inspection  RESP: no audible wheezing  MS: no gross musculoskeletal defects noted, no edema  SKIN: no suspicious lesions or rashes  PSYCH: mentation appears normal, affect normal/bright    Labs reviewed.  Results for orders placed or performed in visit on 24 (from the past 24 hour(s))   Urine Pregnancy Test (BFP)   Result Value Ref Range    Pregnancy Test POS (A)

## 2024-05-23 ENCOUNTER — APPOINTMENT (OUTPATIENT)
Dept: GENERAL RADIOLOGY | Facility: CLINIC | Age: 34
End: 2024-05-23
Attending: EMERGENCY MEDICINE
Payer: COMMERCIAL

## 2024-05-23 ENCOUNTER — APPOINTMENT (OUTPATIENT)
Dept: ULTRASOUND IMAGING | Facility: CLINIC | Age: 34
End: 2024-05-23
Attending: EMERGENCY MEDICINE
Payer: COMMERCIAL

## 2024-05-23 ENCOUNTER — HOSPITAL ENCOUNTER (EMERGENCY)
Facility: CLINIC | Age: 34
Discharge: HOME | End: 2024-05-23
Attending: EMERGENCY MEDICINE | Admitting: EMERGENCY MEDICINE
Payer: COMMERCIAL

## 2024-05-23 VITALS
RESPIRATION RATE: 18 BRPM | HEART RATE: 97 BPM | TEMPERATURE: 97.7 F | HEIGHT: 64 IN | SYSTOLIC BLOOD PRESSURE: 167 MMHG | BODY MASS INDEX: 31.28 KG/M2 | OXYGEN SATURATION: 98 % | WEIGHT: 183.2 LBS | DIASTOLIC BLOOD PRESSURE: 116 MMHG

## 2024-05-23 DIAGNOSIS — L03.90 CELLULITIS, UNSPECIFIED CELLULITIS SITE: ICD-10-CM
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
## 2024-05-23 LAB
ALBUMIN SERPL BCG-MCNC: 4.2 G/DL (ref 3.5–5.2)
ALP SERPL-CCNC: 64 U/L (ref 40–150)
ALT SERPL W P-5'-P-CCNC: 12 U/L (ref 0–50)
ANION GAP SERPL CALCULATED.3IONS-SCNC: 12 MMOL/L (ref 7–15)
AST SERPL W P-5'-P-CCNC: 16 U/L (ref 0–45)
BASOPHILS # BLD AUTO: 0 10E3/UL (ref 0–0.2)
BASOPHILS NFR BLD AUTO: 0 %
BILIRUB SERPL-MCNC: 0.2 MG/DL
BUN SERPL-MCNC: 13.1 MG/DL (ref 6–20)
CALCIUM SERPL-MCNC: 9.2 MG/DL (ref 8.6–10)
CHLORIDE SERPL-SCNC: 103 MMOL/L (ref 98–107)
CREAT SERPL-MCNC: 0.62 MG/DL (ref 0.51–0.95)
DEPRECATED HCO3 PLAS-SCNC: 24 MMOL/L (ref 22–29)
EGFRCR SERPLBLD CKD-EPI 2021: >90 ML/MIN/1.73M2
EOSINOPHIL # BLD AUTO: 0.1 10E3/UL (ref 0–0.7)
EOSINOPHIL NFR BLD AUTO: 1 %
ERYTHROCYTE [DISTWIDTH] IN BLOOD BY AUTOMATED COUNT: 13.1 % (ref 10–15)
GLUCOSE SERPL-MCNC: 131 MG/DL (ref 70–99)
HCT VFR BLD AUTO: 38.5 % (ref 35–47)
HGB BLD-MCNC: 12.5 G/DL (ref 11.7–15.7)
HOLD SPECIMEN: NORMAL
HOLD SPECIMEN: NORMAL
IMM GRANULOCYTES # BLD: 0 10E3/UL
IMM GRANULOCYTES NFR BLD: 0 %
LACTATE SERPL-SCNC: 1.1 MMOL/L (ref 0.7–2)
LYMPHOCYTES # BLD AUTO: 1.8 10E3/UL (ref 0.8–5.3)
LYMPHOCYTES NFR BLD AUTO: 18 %
MCH RBC QN AUTO: 31.3 PG (ref 26.5–33)
MCHC RBC AUTO-ENTMCNC: 32.5 G/DL (ref 31.5–36.5)
MCV RBC AUTO: 97 FL (ref 78–100)
MONOCYTES # BLD AUTO: 0.5 10E3/UL (ref 0–1.3)
MONOCYTES NFR BLD AUTO: 5 %
NEUTROPHILS # BLD AUTO: 7.3 10E3/UL (ref 1.6–8.3)
NEUTROPHILS NFR BLD AUTO: 76 %
NRBC # BLD AUTO: 0 10E3/UL
NRBC BLD AUTO-RTO: 0 /100
PLATELET # BLD AUTO: 305 10E3/UL (ref 150–450)
POTASSIUM SERPL-SCNC: 4.2 MMOL/L (ref 3.4–5.3)
PROT SERPL-MCNC: 7.9 G/DL (ref 6.4–8.3)
RBC # BLD AUTO: 3.99 10E6/UL (ref 3.8–5.2)
SODIUM SERPL-SCNC: 139 MMOL/L (ref 135–145)
WBC # BLD AUTO: 9.6 10E3/UL (ref 4–11)

## 2024-05-23 PROCEDURE — 36415 COLL VENOUS BLD VENIPUNCTURE: CPT | Performed by: EMERGENCY MEDICINE

## 2024-05-23 PROCEDURE — 99285 EMERGENCY DEPT VISIT HI MDM: CPT | Mod: 25

## 2024-05-23 PROCEDURE — 85025 COMPLETE CBC W/AUTO DIFF WBC: CPT | Performed by: EMERGENCY MEDICINE

## 2024-05-23 PROCEDURE — 73590 X-RAY EXAM OF LOWER LEG: CPT | Mod: LT

## 2024-05-23 PROCEDURE — 250N000011 HC RX IP 250 OP 636: Performed by: EMERGENCY MEDICINE

## 2024-05-23 PROCEDURE — 96365 THER/PROPH/DIAG IV INF INIT: CPT

## 2024-05-23 PROCEDURE — 80053 COMPREHEN METABOLIC PANEL: CPT | Performed by: EMERGENCY MEDICINE

## 2024-05-23 PROCEDURE — 83605 ASSAY OF LACTIC ACID: CPT | Performed by: EMERGENCY MEDICINE

## 2024-05-23 PROCEDURE — 93971 EXTREMITY STUDY: CPT | Mod: LT

## 2024-05-23 RX ORDER — AMPICILLIN AND SULBACTAM 2; 1 G/1; G/1
3 INJECTION, POWDER, FOR SOLUTION INTRAMUSCULAR; INTRAVENOUS EVERY 6 HOURS
Status: DISCONTINUED | OUTPATIENT
Start: 2024-05-23 | End: 2024-05-23

## 2024-05-23 RX ORDER — AMPICILLIN AND SULBACTAM 2; 1 G/1; G/1
3 INJECTION, POWDER, FOR SOLUTION INTRAMUSCULAR; INTRAVENOUS ONCE
Status: COMPLETED | OUTPATIENT
Start: 2024-05-23 | End: 2024-05-23

## 2024-05-23 RX ADMIN — AMPICILLIN SODIUM AND SULBACTAM SODIUM 3 G: 2; 1 INJECTION, POWDER, FOR SOLUTION INTRAMUSCULAR; INTRAVENOUS at 16:54

## 2024-05-23 ASSESSMENT — ACTIVITIES OF DAILY LIVING (ADL)
ADLS_ACUITY_SCORE: 33
ADLS_ACUITY_SCORE: 35
ADLS_ACUITY_SCORE: 35

## 2024-05-23 ASSESSMENT — COLUMBIA-SUICIDE SEVERITY RATING SCALE - C-SSRS
1. IN THE PAST MONTH, HAVE YOU WISHED YOU WERE DEAD OR WISHED YOU COULD GO TO SLEEP AND NOT WAKE UP?: NO
6. HAVE YOU EVER DONE ANYTHING, STARTED TO DO ANYTHING, OR PREPARED TO DO ANYTHING TO END YOUR LIFE?: NO
2. HAVE YOU ACTUALLY HAD ANY THOUGHTS OF KILLING YOURSELF IN THE PAST MONTH?: NO

## 2024-05-23 NOTE — ED PROVIDER NOTES
"  Emergency Department Note      History of Present Illness     Chief Complaint  Wound Check    HPI  Hetal Poole is a 33 year old female with a history of hypertension and hyperlipidemia who presents to the ED for evaluation of a cat bite. The patient reports that 3 days ago she was bitten and scratched by a cat, and has noticed that the pain, swelling, and redness has worsened. Adds that the initial redness and swelling began 2 days ago, so they went to , and was prescribed Keflex. Due to no improvement, the patient came to the ED today. She denies any allergies to medication or new medical problems.     Independent Historian  None    Review of External Notes  Reviewed recent urgent care note from 2024 patient presented with cat bite sent home on Keflex.  Past Medical History   Medical History and Problem List  Hypertension  Hyperlipidemia  Pre-eclampsia    Medications  Valtrex  Keflex  Labetalol HCL  Losartan-hydrochlorothiazide    Surgical History   Bunionectomy, Left and Right   section    Physical Exam   Patient Vitals for the past 24 hrs:   BP Temp Temp src Pulse Resp SpO2 Height Weight   24 1540 (!) 167/116 97.7  F (36.5  C) Temporal 97 18 98 % 1.626 m (5' 4\") 83.1 kg (183 lb 3.2 oz)     Physical Exam  Vitals reviewed.   Constitutional:       General: She is not in acute distress.     Appearance: She is not ill-appearing.   HENT:      Head: Normocephalic and atraumatic.   Eyes:      Extraocular Movements: Extraocular movements intact.   Cardiovascular:      Rate and Rhythm: Normal rate and regular rhythm.   Pulmonary:      Effort: Pulmonary effort is normal. No respiratory distress.      Breath sounds: Normal breath sounds. No wheezing.   Abdominal:      Palpations: Abdomen is soft.      Tenderness: There is no abdominal tenderness. There is no guarding.   Musculoskeletal:      Cervical back: Normal range of motion.      Comments: Left leg with swelling to the lateral tibia extending " down to the lateral malleolus there is erythema and warmth.  No palpable induration or fluctuance   Skin:     General: Skin is warm and dry.   Neurological:      Mental Status: She is alert and oriented to person, place, and time.      GCS: GCS eye subscore is 4. GCS verbal subscore is 5. GCS motor subscore is 6.   Psychiatric:         Behavior: Behavior normal.         Diagnostics   Lab Results   Labs Ordered and Resulted from Time of ED Arrival to Time of ED Departure   COMPREHENSIVE METABOLIC PANEL - Abnormal       Result Value    Sodium 139      Potassium 4.2      Carbon Dioxide (CO2) 24      Anion Gap 12      Urea Nitrogen 13.1      Creatinine 0.62      GFR Estimate >90      Calcium 9.2      Chloride 103      Glucose 131 (*)     Alkaline Phosphatase 64      AST 16      ALT 12      Protein Total 7.9      Albumin 4.2      Bilirubin Total 0.2     LACTIC ACID WHOLE BLOOD - Normal    Lactic Acid 1.1     CBC WITH PLATELETS AND DIFFERENTIAL    WBC Count 9.6      RBC Count 3.99      Hemoglobin 12.5      Hematocrit 38.5      MCV 97      MCH 31.3      MCHC 32.5      RDW 13.1      Platelet Count 305      % Neutrophils 76      % Lymphocytes 18      % Monocytes 5      % Eosinophils 1      % Basophils 0      % Immature Granulocytes 0      NRBCs per 100 WBC 0      Absolute Neutrophils 7.3      Absolute Lymphocytes 1.8      Absolute Monocytes 0.5      Absolute Eosinophils 0.1      Absolute Basophils 0.0      Absolute Immature Granulocytes 0.0      Absolute NRBCs 0.0         Imaging  XR Tibia and Fibula Left 2 Views   Final Result   IMPRESSION: The left tibia and fibula are negative for fracture. No radiographic evidence for  osteomyelitis. There is edema or cellulitis about the lower leg. No subcutaneous gas. No foreign body. Old fracture fragments adjacent to the medial and    lateral malleolar tips. These are not acute.      US Lower Extremity Venous Duplex Left   Final Result   IMPRESSION:   1.  No deep venous thrombosis in  the left lower extremity.        Independent Interpretation  X-ray Tibula and Fibula Left  shows no fracture.  ED Course    Medications Administered  Medications   ampicillin-sulbactam (UNASYN) 3 g vial to attach to  mL bag (0 g Intravenous Stopped 5/23/24 8988)       Procedures  Procedures     Discussion of Management  None    Social Determinants of Health adding to complexity of care  None    ED Course  ED Course as of 05/23/24 2204   Thu May 23, 2024   1536 I obtained history and examined the patient as noted above.    1816 I rechecked and updated the patient.      Medical Decision Making / Diagnosis   CMS Diagnoses: None    MIPS     None    MDM  Hetal Poole is a 33 year old female who presents today with cat bite that occurred on 5/19/2024.  She was seen at an urgent care 2 days ago placed on Keflex.  She reports that there was a cat bite and scratch to the left lateral leg.  She has had worsening redness and warmth to the area.  No prior history of DVTs PEs in the past.  The right lower extremity appears tender.  No palpable crepitance.  No palpable areas of induration or fluctuance.  I discussed with her the plan for x-ray, blood work, ultrasound Dopplers.  She agreed with plan of care.  She was given IV Unasyn.  No evidence of any leukocytosis.  Low suspicion for sepsis patient afebrile.  Hemodynamically stable.  I discussed the results at length with patient and family at the bedside.  Discussed plan for changing antibiotics to Augmentin at this time.  Instructed them to follow-up with her primary care doctor in 1 to 2 days for reevaluation.  Discussed tricked return precautions including any fevers, worsening infection or any concerning symptoms.  They verbalized understanding and agreement.    Disposition  The patient was discharged.     ICD-10 Codes:    ICD-10-CM    1. Cat bite, initial encounter  W55.01XA       2. Cellulitis, unspecified cellulitis site  L03.90            Discharge  Medications  Discharge Medication List as of 5/23/2024  6:21 PM        START taking these medications    Details   amoxicillin-clavulanate (AUGMENTIN) 875-125 MG tablet Take 1 tablet by mouth 2 times daily for 10 days, Disp-20 tablet, R-0, Local Print               Scribe Disclosure:  I, Rosaura Tran, am serving as a scribe at 6:17 PM on 5/23/2024 to document services personally performed by Aneta Obrien, \ based on my observations and the provider's statements to me.        Aneta Obrien DO  05/23/24 2205

## 2024-05-23 NOTE — ED TRIAGE NOTES
Pt presents after a cat scratch  and bite on Sunday. Was the pts cat. Was seen at  and given keflex. Reports taking it as prescribed. Border drawn around it at , redness and swelling gone outside of border. A & Ox4.     Triage Assessment (Adult)       Row Name 05/23/24 1540          Triage Assessment    Airway WDL WDL        Respiratory WDL    Respiratory WDL WDL        Skin Circulation/Temperature WDL    Skin Circulation/Temperature WDL X  Cat scratch and redness on L calf        Cardiac WDL    Cardiac WDL WDL        Cognitive/Neuro/Behavioral WDL    Cognitive/Neuro/Behavioral WDL WDL

## 2024-05-23 NOTE — DISCHARGE INSTRUCTIONS
Take the antibiotics as directed    Follow-up with your primary care doctor in 1 to 2 days    Return to the ER for any worsening or concerning symptoms.  If after 2 days of antibiotics you notice any worsening of the redness or fevers or increased pain please return to the ER immediately.

## 2024-05-29 NOTE — PROGRESS NOTES
Assessment & Plan     1. Essential hypertension, benign  - Discussed with Hetal blood pressure has significantly improved from what it was before however is still not meeting goal of <130/80 and with her family history I recommend she meet this goal. Will plan for her to continue to take Losartan-Hydrochlorothiazide 100-25 mg daily, refills sent for 6 months and also start taking Amlodipine 2.5 mg daily for next couple of months and then return to the clinic for a follow up visit. Reviewed recent CMP from ER visit on 05/23/24 which was within normal limits. She will continue to check her blood pressure daily and keep a log of her values and bring this in to her follow up visit. Side effects of Amlodipine discussed. Return to clinic and ER precautions discussed.   - losartan-hydrochlorothiazide (HYZAAR) 100-25 MG tablet; Take 1 tablet by mouth daily  Dispense: 90 tablet; Refill: 1  - amLODIPine (NORVASC) 2.5 MG tablet; Take 1 tablet (2.5 mg) by mouth daily  Dispense: 60 tablet; Refill: 0    Follow up in two months for a medication recheck. Reasons to follow-up sooner or seek emergent care reviewed.     Cindy Aleman PA-C  [facility]       Subjective     Hetal Poole is a 33 year old female who presents to clinic today for the following health issues:    HPI   Chief Complaint   Patient presents with    Recheck Medication     Recheck BP, she has been monitoring 1x a day and BP has been running good      Hetal presents for a blood pressure recheck. Her dose of Losartan-Hydrochlorothiazide was increased to 100-25 mg on 04/05/24 which she was taking however stopped taking this on 04/18/24 as she had a positive at home pregnancy test however then had some vaginal bleeding. She notes she saw OBGYN specialists and they did the pregnancy blood test which came back negative and ultrasound did did not show any viable pregnancy. They believe she likely had a miscarriage. At that visit she was started on  Labetalol 100 mg BID in case she was planning on getting pregnant however Hetal denies this and notes she does not want to get pregnant. She is not interested in any form of birth control other than using condoms with her significant other for contraception. She has had regular periods since. She notes since she is not planning on becoming pregnant she has not been taking Labetalol and has instead gone back to take Losartan-Hydrochlorothiazide 100-25 mg daily. She notes the medication seems to be working well for her and she denies any side effects. She notes her blood pressure has been around the 135-140 / 80-90 range. She denies any symptoms of chest pain or SOB.     Daily medications reviewed. Of note, Hetal was recently seen in the ER for a cat bite/cellulitis of her left lower leg and is currently on Augmentin for this, she notes her infection has completely resolved and she is no longer having any pain.       Objective    /88 (BP Location: Left arm, Patient Position: Sitting, Cuff Size: Adult Large)   Pulse 95   Temp 97.8  F (36.6  C) (Temporal)   Wt 82.1 kg (181 lb)   SpO2 98%   BMI 31.07 kg/m    Body mass index is 31.07 kg/m .    Physical Examination:  GENERAL: healthy, alert and no distress  EYES: Eyes grossly normal to inspection, PERRL and conjunctivae and sclerae normal  HENT: mouth without ulcers or lesions  NECK: no adenopathy, no asymmetry, masses, or scars and thyroid normal to palpation  RESP: lungs clear to auscultation - no rales, rhonchi or wheezes  CV: regular rate and rhythm, normal S1 S2, no S3 or S4, no murmur, click or rub, no peripheral edema   MS: no gross musculoskeletal defects noted, no edema  SKIN: no suspicious lesions or rashes  PSYCH: mentation appears normal, affect normal/bright    Recent lab work reviewed from ER visit on 05/23/24.

## 2024-05-30 ENCOUNTER — OFFICE VISIT (OUTPATIENT)
Dept: FAMILY MEDICINE | Facility: CLINIC | Age: 34
End: 2024-05-30

## 2024-05-30 VITALS
WEIGHT: 181 LBS | TEMPERATURE: 97.8 F | DIASTOLIC BLOOD PRESSURE: 88 MMHG | BODY MASS INDEX: 31.07 KG/M2 | SYSTOLIC BLOOD PRESSURE: 135 MMHG | OXYGEN SATURATION: 98 % | HEART RATE: 95 BPM

## 2024-05-30 DIAGNOSIS — I10 ESSENTIAL HYPERTENSION, BENIGN: Primary | ICD-10-CM

## 2024-05-30 PROCEDURE — 99213 OFFICE O/P EST LOW 20 MIN: CPT

## 2024-05-30 RX ORDER — AMLODIPINE BESYLATE 2.5 MG/1
2.5 TABLET ORAL DAILY
Qty: 60 TABLET | Refills: 0 | Status: SHIPPED | OUTPATIENT
Start: 2024-05-30

## 2024-05-30 RX ORDER — LOSARTAN POTASSIUM AND HYDROCHLOROTHIAZIDE 25; 100 MG/1; MG/1
1 TABLET ORAL DAILY
Qty: 90 TABLET | Status: CANCELLED | OUTPATIENT
Start: 2024-05-30

## 2024-05-30 RX ORDER — LOSARTAN POTASSIUM AND HYDROCHLOROTHIAZIDE 12.5; 1 MG/1; MG/1
1 TABLET ORAL DAILY
COMMUNITY
Start: 2024-02-27 | End: 2024-05-30 | Stop reason: DRUGHIGH

## 2024-05-30 RX ORDER — LABETALOL 100 MG/1
TABLET, FILM COATED ORAL
COMMUNITY
Start: 2024-04-19 | End: 2024-05-30

## 2024-05-30 RX ORDER — LOSARTAN POTASSIUM AND HYDROCHLOROTHIAZIDE 25; 100 MG/1; MG/1
1 TABLET ORAL DAILY
Qty: 90 TABLET | Refills: 1 | Status: SHIPPED | OUTPATIENT
Start: 2024-05-30

## 2024-05-30 NOTE — NURSING NOTE
Chief Complaint   Patient presents with    Recheck Medication     Recheck BP, she has been monitoring 1x a day and BP has been running good     Pre-visit Screening:  Immunizations:  up to date  Colonoscopy:  NA  Mammogram: NA  Asthma Action Test/Plan:  NA  PHQ9:  NA  GAD7:  NA  Questioned patient about current smoking habits Pt. has never smoked.  Ok to leave detailed message on voice mail for today's visit only Yes, phone # [phone]

## 2024-06-17 PROBLEM — Z76.89 HEALTH CARE HOME: Status: RESOLVED | Noted: 2023-10-03 | Resolved: 2024-06-17

## 2024-07-26 ENCOUNTER — TRANSFERRED RECORDS (OUTPATIENT)
Dept: FAMILY MEDICINE | Facility: CLINIC | Age: 34
End: 2024-07-26

## 2024-10-01 ENCOUNTER — APPOINTMENT (OUTPATIENT)
Dept: URBAN - METROPOLITAN AREA CLINIC 256 | Age: 34
Setting detail: DERMATOLOGY
End: 2024-10-01

## 2024-10-01 VITALS — HEIGHT: 64 IN | WEIGHT: 152.12 LBS

## 2024-10-01 DIAGNOSIS — L82.0 INFLAMED SEBORRHEIC KERATOSIS: ICD-10-CM

## 2024-10-01 DIAGNOSIS — L30.4 ERYTHEMA INTERTRIGO: ICD-10-CM

## 2024-10-01 DIAGNOSIS — D49.2 NEOPLASM OF UNSPECIFIED BEHAVIOR OF BONE, SOFT TISSUE, AND SKIN: ICD-10-CM

## 2024-10-01 DIAGNOSIS — B07.8 OTHER VIRAL WARTS: ICD-10-CM

## 2024-10-01 PROCEDURE — OTHER MIPS QUALITY: OTHER

## 2024-10-01 PROCEDURE — 17110 DESTRUCT B9 LESION 1-14: CPT

## 2024-10-01 PROCEDURE — OTHER LIQUID NITROGEN: OTHER

## 2024-10-01 PROCEDURE — OTHER EDUCATIONAL RESOURCES PROVIDED: OTHER

## 2024-10-01 PROCEDURE — OTHER PRESCRIPTION MEDICATION MANAGEMENT: OTHER

## 2024-10-01 PROCEDURE — 11102 TANGNTL BX SKIN SINGLE LES: CPT | Mod: 59

## 2024-10-01 PROCEDURE — 99203 OFFICE O/P NEW LOW 30 MIN: CPT | Mod: 25

## 2024-10-01 PROCEDURE — OTHER COUNSELING: OTHER

## 2024-10-01 PROCEDURE — OTHER BIOPSY BY SHAVE METHOD: OTHER

## 2024-10-01 PROCEDURE — OTHER PATIENT SPECIFIC COUNSELING: OTHER

## 2024-10-01 PROCEDURE — OTHER PHOTO-DOCUMENTATION: OTHER

## 2024-10-01 PROCEDURE — OTHER PRESCRIPTION: OTHER

## 2024-10-01 RX ORDER — KETOCONAZOLE 20 MG/G
CREAM TOPICAL BID
Qty: 60 | Refills: 0 | Status: ERX | COMMUNITY
Start: 2024-10-01

## 2024-10-01 ASSESSMENT — LOCATION DETAILED DESCRIPTION DERM
LOCATION DETAILED: LEFT MEDIAL BREAST 8-9:00 REGION
LOCATION DETAILED: RIGHT RADIAL DORSAL HAND
LOCATION DETAILED: LEFT MEDIAL BREAST 10-11:00 REGION
LOCATION DETAILED: SUBXIPHOID
LOCATION DETAILED: EPIGASTRIC SKIN
LOCATION DETAILED: LEFT MEDIAL BREAST 9-10:00 REGION
LOCATION DETAILED: 4TH WEB SPACE RIGHT HAND
LOCATION DETAILED: LEFT DISTAL DORSAL FOREARM
LOCATION DETAILED: LEFT INFRAMAMMARY CREASE (INNER QUADRANT)

## 2024-10-01 ASSESSMENT — LOCATION SIMPLE DESCRIPTION DERM
LOCATION SIMPLE: RIGHT HAND
LOCATION SIMPLE: LEFT FOREARM
LOCATION SIMPLE: LEFT BREAST
LOCATION SIMPLE: ABDOMEN

## 2024-10-01 ASSESSMENT — LOCATION ZONE DERM
LOCATION ZONE: HAND
LOCATION ZONE: ARM
LOCATION ZONE: TRUNK

## 2024-10-01 NOTE — PROCEDURE: PATIENT SPECIFIC COUNSELING
- Informed patient symptoms are consistent with intertrigo, which is fungal\\n- Discussed treatment option ketoconazole cream; risks and benefits reviewed\\n- Advised patient to RTC as needed with concerns or if symptoms don't resolve\\n- Patient was agreeable
Detail Level: Zone

## 2024-10-01 NOTE — HPI: SKIN LESION
What Type Of Note Output Would You Prefer (Optional)?: Standard Output
Has Your Skin Lesion Been Treated?: not been treated
Is This A New Presentation, Or A Follow-Up?: Mole
Additional History: Started growing 1 year ago
Is This A New Presentation, Or A Follow-Up?: Skin Lesions

## 2024-10-01 NOTE — PROCEDURE: PRESCRIPTION MEDICATION MANAGEMENT
Render In Strict Bullet Format?: No
Detail Level: Zone
Initiate Treatment: With flares, apply ketoconazole 2 % topical cream to affected area twice a day until clear. Repeat as needed and keep area dry as able.
Plan: RTC as needed with concerns or if symptoms don't resolve.

## 2024-10-01 NOTE — PROCEDURE: LIQUID NITROGEN
Include Z78.9 (Other Specified Conditions Influencing Health Status) As An Associated Diagnosis?: No
Show Topical Anesthesia Variable?: Yes
Consent: The patient's consent was obtained including but not limited to risks of crusting, scabbing, blistering, scarring, darker or lighter pigmentary change, recurrence, incomplete removal and infection.
Medical Necessity Clause: This procedure was medically necessary because the lesions that were treated were:
Spray Paint Text: The liquid nitrogen was applied to the skin utilizing a spray paint frosting technique.
Medical Necessity Information: It is in your best interest to select a reason for this procedure from the list below. All of these items fulfill various CMS LCD requirements except the new and changing color options.
Detail Level: Detailed
Duration Of Freeze Thaw-Cycle (Seconds): 5-10
Number Of Freeze-Thaw Cycles: 2 freeze-thaw cycles
Post-Care Instructions: I reviewed with the patient in detail post-care instructions. Patient is to wear sunprotection, and avoid picking at any of the treated lesions. Pt may apply Vaseline to crusted or scabbing areas.

## 2024-12-09 NOTE — PROCEDURE: BIOPSY BY SHAVE METHOD
Detail Level: Detailed
Add 67958 Cpt? (Important Note: In 2017 The Use Of 55973 Is Being Tracked By Cms To Determine Future Global Period Reimbursement For Global Periods): no
Detail Level: Detailed
Depth Of Biopsy: dermis
Was A Bandage Applied: Yes
Size Of Lesion In Cm: 0
Biopsy Type: H and E
Biopsy Method: Personna blade
Anesthesia Type: 1% Xylocaine with 1:200,000 epinephrine and sodium bicarbonate
Anesthesia Volume In Cc: 0.5
Hemostasis: Drysol
Wound Care: Petrolatum
Dressing: bandage
Destruction After The Procedure: No
Type Of Destruction Used: Curettage
Curettage Text: The wound bed was treated with curettage after the biopsy was performed.
Cryotherapy Text: The wound bed was treated with cryotherapy after the biopsy was performed.
Electrodesiccation Text: The wound bed was treated with electrodesiccation after the biopsy was performed.
Electrodesiccation And Curettage Text: The wound bed was treated with electrodesiccation and curettage after the biopsy was performed.
Silver Nitrate Text: The wound bed was treated with silver nitrate after the biopsy was performed.
Lab: -[id number]
Path Notes (To The Dermatopathologist): Please check margins.
Consent: Written consent was obtained and risks were reviewed including but not limited to scarring, infection, bleeding, scabbing, incomplete removal, nerve damage and allergy to anesthesia.
Post-Care Instructions: I reviewed with the patient in detail post-care instructions. Patient is to keep the biopsy site dry overnight, and then apply bacitracin twice daily until healed.
Notification Instructions: Patient will be notified of biopsy results. However, patient instructed to call the office if not contacted within 2 weeks.
Billing Type: Third-Party Bill
Information: Selecting Yes will display possible errors in your note based on the variables you have selected. This validation is only offered as a suggestion for you. PLEASE NOTE THAT THE VALIDATION TEXT WILL BE REMOVED WHEN YOU FINALIZE YOUR NOTE. IF YOU WANT TO FAX A PRELIMINARY NOTE YOU WILL NEED TO TOGGLE THIS TO 'NO' IF YOU DO NOT WANT IT IN YOUR FAXED NOTE.
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
## 2025-02-13 ENCOUNTER — OFFICE VISIT (OUTPATIENT)
Dept: FAMILY MEDICINE | Facility: CLINIC | Age: 35
End: 2025-02-13

## 2025-02-13 VITALS
DIASTOLIC BLOOD PRESSURE: 126 MMHG | HEART RATE: 102 BPM | OXYGEN SATURATION: 99 % | WEIGHT: 178 LBS | SYSTOLIC BLOOD PRESSURE: 156 MMHG | BODY MASS INDEX: 30.55 KG/M2

## 2025-02-13 DIAGNOSIS — F41.1 GENERALIZED ANXIETY DISORDER: ICD-10-CM

## 2025-02-13 DIAGNOSIS — M54.6 ACUTE MIDLINE THORACIC BACK PAIN: ICD-10-CM

## 2025-02-13 DIAGNOSIS — F43.10 PTSD (POST-TRAUMATIC STRESS DISORDER): ICD-10-CM

## 2025-02-13 DIAGNOSIS — I10 ESSENTIAL HYPERTENSION, BENIGN: Primary | ICD-10-CM

## 2025-02-13 DIAGNOSIS — R10.30 LOWER ABDOMINAL PAIN: ICD-10-CM

## 2025-02-13 DIAGNOSIS — F33.0 MILD EPISODE OF RECURRENT MAJOR DEPRESSIVE DISORDER: ICD-10-CM

## 2025-02-13 DIAGNOSIS — Z86.19 HISTORY OF COLD SORES: ICD-10-CM

## 2025-02-13 PROCEDURE — G2211 COMPLEX E/M VISIT ADD ON: HCPCS

## 2025-02-13 PROCEDURE — 96127 BRIEF EMOTIONAL/BEHAV ASSMT: CPT

## 2025-02-13 PROCEDURE — 36415 COLL VENOUS BLD VENIPUNCTURE: CPT

## 2025-02-13 PROCEDURE — 99214 OFFICE O/P EST MOD 30 MIN: CPT | Mod: 25

## 2025-02-13 PROCEDURE — 80053 COMPREHEN METABOLIC PANEL: CPT

## 2025-02-13 RX ORDER — AMLODIPINE BESYLATE 5 MG/1
5 TABLET ORAL DAILY
Qty: 30 TABLET | Refills: 0 | Status: CANCELLED | OUTPATIENT
Start: 2025-02-13

## 2025-02-13 RX ORDER — HYDROXYZINE HYDROCHLORIDE 25 MG/1
12.5-25 TABLET, FILM COATED ORAL 3 TIMES DAILY PRN
Qty: 60 TABLET | Refills: 1 | Status: SHIPPED | OUTPATIENT
Start: 2025-02-13

## 2025-02-13 RX ORDER — AMLODIPINE BESYLATE 5 MG/1
5-10 TABLET ORAL DAILY
Qty: 60 TABLET | Refills: 0 | Status: SHIPPED | OUTPATIENT
Start: 2025-02-13

## 2025-02-13 RX ORDER — AMLODIPINE BESYLATE 2.5 MG/1
2.5 TABLET ORAL DAILY
Qty: 60 TABLET | Refills: 0 | Status: CANCELLED | OUTPATIENT
Start: 2025-02-13

## 2025-02-13 RX ORDER — VALACYCLOVIR HYDROCHLORIDE 1 G/1
2000 TABLET, FILM COATED ORAL 2 TIMES DAILY
Qty: 20 TABLET | Refills: 0 | Status: SHIPPED | OUTPATIENT
Start: 2025-02-13

## 2025-02-13 RX ORDER — LOSARTAN POTASSIUM AND HYDROCHLOROTHIAZIDE 25; 100 MG/1; MG/1
1 TABLET ORAL DAILY
Qty: 90 TABLET | Refills: 1 | Status: SHIPPED | OUTPATIENT
Start: 2025-02-13

## 2025-02-13 RX ORDER — LOSARTAN POTASSIUM AND HYDROCHLOROTHIAZIDE 25; 100 MG/1; MG/1
1 TABLET ORAL DAILY
Qty: 30 TABLET | Refills: 0 | Status: CANCELLED | OUTPATIENT
Start: 2025-02-13

## 2025-02-13 ASSESSMENT — ANXIETY QUESTIONNAIRES
5. BEING SO RESTLESS THAT IT IS HARD TO SIT STILL: NOT AT ALL
2. NOT BEING ABLE TO STOP OR CONTROL WORRYING: NOT AT ALL
6. BECOMING EASILY ANNOYED OR IRRITABLE: NOT AT ALL
GAD7 TOTAL SCORE: 0
GAD7 TOTAL SCORE: 0
3. WORRYING TOO MUCH ABOUT DIFFERENT THINGS: NOT AT ALL
1. FEELING NERVOUS, ANXIOUS, OR ON EDGE: NOT AT ALL
IF YOU CHECKED OFF ANY PROBLEMS ON THIS QUESTIONNAIRE, HOW DIFFICULT HAVE THESE PROBLEMS MADE IT FOR YOU TO DO YOUR WORK, TAKE CARE OF THINGS AT HOME, OR GET ALONG WITH OTHER PEOPLE: NOT DIFFICULT AT ALL
7. FEELING AFRAID AS IF SOMETHING AWFUL MIGHT HAPPEN: NOT AT ALL

## 2025-02-13 ASSESSMENT — PATIENT HEALTH QUESTIONNAIRE - PHQ9
5. POOR APPETITE OR OVEREATING: NOT AT ALL
SUM OF ALL RESPONSES TO PHQ QUESTIONS 1-9: 1

## 2025-02-13 NOTE — LETTER
My Depression Action Plan  Name: Hetal Poole   Date of Birth 1990  Date: 2/13/2025    My doctor: Cindy Aleman   My clinic: [facility]  [address]  [address]  [facility] [id number] [phone]            GREEN    ZONE   Good Control    What it looks like:   Things are going generally well. You have normal ups and downs. You may even feel depressed from time to time, but bad moods usually last less than a day.   What you need to do:  Continue to care for yourself (see self care plan)  Check your depression survival kit and update it as needed  Follow your physician s recommendations including any medication.  Do not stop taking medication unless you consult with your physician first.             YELLOW         ZONE Getting Worse    What it looks like:   Depression is starting to interfere with your life.   It may be hard to get out of bed; you may be starting to isolate yourself from others.  Symptoms of depression are starting to last most all day and this has happened for several days.   You may have suicidal thoughts but they are not constant.   What you need to do:     Call your care team. Your response to treatment will improve if you keep your care team informed of your progress. Yellow periods are signs an adjustment may need to be made.     Continue your self-care.  Just get dressed and ready for the day.  Don't give yourself time to talk yourself out of it.    Talk to someone in your support network.    Open up your Depression Self-Care Plan/Wellness Kit.             RED    ZONE Medical Alert - Get Help    What it looks like:   Depression is seriously interfering with your life.   You may experience these or other symptoms: You can t get out of bed most days, can t work or engage in other necessary activities, you have trouble taking care of basic hygiene, or basic responsibilities, thoughts of suicide or death that will not go away, self-injurious  behavior.     What you need to do:  Call your care team and request a same-day appointment. If they are not available (weekends or after hours) call your local crisis line, emergency room or 911.          Depression Self-Care Plan / Wellness Kit    Many people find that medication and therapy are helpful treatments for managing depression. In addition, making small changes to your everyday life can help to boost your mood and improve your wellbeing. Below are some tips for you to consider. Be sure to talk with your medical provider and/or behavioral health consultant if your symptoms are worsening or not improving.     Sleep   Sleep hygiene  means all of the habits that support good, restful sleep. It includes maintaining a consistent bedtime and wake time, using your bedroom only for sleeping or sex, and keeping the bedroom dark and free of distractions like a computer, smartphone, or television.     Develop a Healthy Routine  Maintain good hygiene. Get out of bed in the morning, make your bed, brush your teeth, take a shower, and get dressed. Don t spend too much time viewing media that makes you feel stressed. Find time to relax each day.    Exercise  Get some form of exercise every day. This will help reduce pain and release endorphins, the  feel good  chemicals in your brain. It can be as simple as just going for a walk or doing some gardening, anything that will get you moving.      Diet  Strive to eat healthy foods, including fruits and vegetables. Drink plenty of water. Avoid excessive sugar, caffeine, alcohol, and other mood-altering substances.     Stay Connected with Others  Stay in touch with friends and family members.    Manage Your Mood  Try deep breathing, massage therapy, biofeedback, or meditation. Take part in fun activities when you can. Try to find something to smile about each day.     Psychotherapy  Be open to working with a therapist if your provider recommends it.     Medication  Be sure to  take your medication as prescribed. Most anti-depressants need to be taken every day. It usually takes several weeks for medications to work. Not all medicines work for all people. It is important to follow-up with your provider to make sure you have a treatment plan that is working for you. Do not stop your medication abruptly without first discussing it with your provider.    Crisis Resources   These hotlines are for both adults and children. They and are open 24 hours a day, 7 days a week unless noted otherwise.    National Suicide Prevention Lifeline   988 or 8-065-416-BEMJ (8959)    Crisis Text Line    www.crisistextline.org  Text HOME to 580147 from anywhere in the [place], anytime, about any type of crisis. A live, trained crisis counselor will receive the text and respond quickly.    [place] Lifeline for LGBTQ Youth  A national crisis intervention and suicide lifeline for LGBTQ youth under 25. Provides a safe place to talk without judgement. Call 1-733.421.9082; text START to 902046 or visit www.thetrevorproject.org to talk to a trained counselor.    For [facility] crisis numbers, visit the [facility] website at:  https://[place].gov/dhs/people-we-serve/adults/health-care/mental-health/resources/crisis-contacts.jsp

## 2025-02-14 LAB
ALBUMIN SERPL-MCNC: 4.4 G/DL (ref 3.6–5.1)
ALP SERPL-CCNC: 47 U/L (ref 33–130)
ALT 1742-6: 21 U/L (ref 0–32)
AST 1920-8: 13 U/L (ref 0–35)
BILIRUB SERPL-MCNC: 0.4 MG/DL (ref 0.2–1.2)
BUN SERPL-MCNC: 18 MG/DL (ref 7–25)
BUN/CREATININE RATIO: 20 (ref 6–32)
CALCIUM SERPL-MCNC: 9.7 MG/DL (ref 8.6–10.3)
CHLORIDE SERPLBLD-SCNC: 101.9 MMOL/L (ref 98–110)
CO2 SERPL-SCNC: 28.8 MMOL/L (ref 20–32)
CREAT SERPL-MCNC: 0.92 MG/DL (ref 0.6–1.3)
GLUCOSE SERPL-MCNC: 91 MG/DL (ref 60–99)
POTASSIUM SERPL-SCNC: 4.33 MMOL/L (ref 3.5–5.3)
PROT SERPL-MCNC: 8 G/DL (ref 6.1–8.1)
SODIUM SERPL-SCNC: 141.5 MMOL/L (ref 135–146)

## 2025-02-24 ENCOUNTER — TELEPHONE (OUTPATIENT)
Dept: FAMILY MEDICINE | Facility: CLINIC | Age: 35
End: 2025-02-24

## 2025-02-24 DIAGNOSIS — Z98.891 HISTORY OF C-SECTION: ICD-10-CM

## 2025-02-24 DIAGNOSIS — R10.2 CHRONIC PELVIC PAIN IN FEMALE: Primary | ICD-10-CM

## 2025-02-24 DIAGNOSIS — G89.29 CHRONIC PELVIC PAIN IN FEMALE: Primary | ICD-10-CM

## 2025-02-24 NOTE — TELEPHONE ENCOUNTER
Janie called asking about the referral for a surgeon that was talked about at patients last office visit 2025.   This is regarding the pain patient is feeling in her  scar     Please advise     Janie can be reached at [phone]    Thank you

## 2025-02-25 NOTE — TELEPHONE ENCOUNTER
Ultrasound order faxed to     [facility]   [facility]  [address]  [address] [place] [id number]  [phone] -  Main Scheduling  [phone] -  Fax

## 2025-02-25 NOTE — TELEPHONE ENCOUNTER
Called Janie back and discussed next step in evaluating patient's pelvic pain would be to have a pelvic ultrasound completed which patient is in agreement with. Order placed to [facility] in [place]. Will follow up with results and next steps at patient's follow up appointment on 03/11/25.     Cindy Aleman PA-C  [facility]

## 2025-03-11 ENCOUNTER — OFFICE VISIT (OUTPATIENT)
Dept: FAMILY MEDICINE | Facility: CLINIC | Age: 35
End: 2025-03-11

## 2025-03-11 VITALS
OXYGEN SATURATION: 99 % | DIASTOLIC BLOOD PRESSURE: 89 MMHG | BODY MASS INDEX: 31.41 KG/M2 | WEIGHT: 183 LBS | SYSTOLIC BLOOD PRESSURE: 138 MMHG | TEMPERATURE: 97.5 F | HEART RATE: 100 BPM

## 2025-03-11 DIAGNOSIS — F41.1 GENERALIZED ANXIETY DISORDER: ICD-10-CM

## 2025-03-11 DIAGNOSIS — F43.10 PTSD (POST-TRAUMATIC STRESS DISORDER): ICD-10-CM

## 2025-03-11 DIAGNOSIS — N83.9 LESION OF RIGHT OVARY: ICD-10-CM

## 2025-03-11 DIAGNOSIS — Z98.891 HISTORY OF C-SECTION: ICD-10-CM

## 2025-03-11 DIAGNOSIS — F33.0 MILD EPISODE OF RECURRENT MAJOR DEPRESSIVE DISORDER: ICD-10-CM

## 2025-03-11 DIAGNOSIS — I10 ESSENTIAL HYPERTENSION, BENIGN: Primary | ICD-10-CM

## 2025-03-11 DIAGNOSIS — R10.2 CHRONIC PELVIC PAIN IN FEMALE: ICD-10-CM

## 2025-03-11 DIAGNOSIS — G89.29 CHRONIC PELVIC PAIN IN FEMALE: ICD-10-CM

## 2025-03-11 RX ORDER — AMLODIPINE BESYLATE 5 MG/1
5-10 TABLET ORAL DAILY
Qty: 60 TABLET | Refills: 0 | Status: CANCELLED | OUTPATIENT
Start: 2025-03-11

## 2025-03-11 RX ORDER — AMLODIPINE BESYLATE 10 MG/1
10 TABLET ORAL DAILY
Qty: 45 TABLET | Refills: 0 | Status: SHIPPED | OUTPATIENT
Start: 2025-03-11 | End: 2025-04-25

## 2025-03-11 RX ORDER — KETOCONAZOLE 20 MG/G
CREAM TOPICAL
COMMUNITY
Start: 2024-12-08

## 2025-03-11 ASSESSMENT — ANXIETY QUESTIONNAIRES
GAD7 TOTAL SCORE: 0
IF YOU CHECKED OFF ANY PROBLEMS ON THIS QUESTIONNAIRE, HOW DIFFICULT HAVE THESE PROBLEMS MADE IT FOR YOU TO DO YOUR WORK, TAKE CARE OF THINGS AT HOME, OR GET ALONG WITH OTHER PEOPLE: NOT DIFFICULT AT ALL
2. NOT BEING ABLE TO STOP OR CONTROL WORRYING: NOT AT ALL
1. FEELING NERVOUS, ANXIOUS, OR ON EDGE: NOT AT ALL
7. FEELING AFRAID AS IF SOMETHING AWFUL MIGHT HAPPEN: NOT AT ALL
5. BEING SO RESTLESS THAT IT IS HARD TO SIT STILL: NOT AT ALL
GAD7 TOTAL SCORE: 0
3. WORRYING TOO MUCH ABOUT DIFFERENT THINGS: NOT AT ALL
6. BECOMING EASILY ANNOYED OR IRRITABLE: NOT AT ALL

## 2025-03-11 ASSESSMENT — PATIENT HEALTH QUESTIONNAIRE - PHQ9
SUM OF ALL RESPONSES TO PHQ QUESTIONS 1-9: 0
5. POOR APPETITE OR OVEREATING: NOT AT ALL

## 2025-03-11 NOTE — PROGRESS NOTES
Assessment & Plan     1. Essential hypertension, benign (Primary)  - Improving however not at goal of being <130/80. Will plan to have Hetal take Amlodipine 10 mg daily for next month along with Losartan-Hydrochlorothiazide 100-25 mg daily, RX sent for one month. She will continue to check her BP at home and keep a log of her values. At follow up visit will add in beta blocker if BP is still not <130/80 and then refer to cardiology if still not at goal.   - amLODIPine (NORVASC) 10 MG tablet; Take 1 tablet (10 mg) by mouth daily.  Dispense: 45 tablet; Refill: 0    2. PTSD (post-traumatic stress disorder)  - Improved, however Hetal did not know she needed to take Sertraline 50 mg everyday, will start doing this now and then only take Hydroxyzine PRN. Will recheck on this in one month.   - sertraline (ZOLOFT) 50 MG tablet; Take 1 tablet (50 mg) by mouth daily.  Dispense: 45 tablet; Refill: 0    3. Generalized anxiety disorder  - See above.   - sertraline (ZOLOFT) 50 MG tablet; Take 1 tablet (50 mg) by mouth daily.  Dispense: 45 tablet; Refill: 0    4. Mild episode of recurrent major depressive disorder  - See above.   - sertraline (ZOLOFT) 50 MG tablet; Take 1 tablet (50 mg) by mouth daily.  Dispense: 45 tablet; Refill: 0    5. Lesion of right ovary  - Reviewed pelvic ultrasound with patient. Will place order for MRI of pelvis and review results at follow up visit and next steps.   - Radiology Referral (Affiliate Use Only)  - MR Pelvis (Gyn) w/o & w Contrast    6. Chronic pelvic pain in female  - See above.   - Radiology Referral (Affiliate Use Only)  - MR Pelvis (Gyn) w/o & w Contrast    7. History of   - See above.   - Radiology Referral (Affiliate Use Only)  - MR Pelvis (Gyn) w/o & w Contrast    Follow up in 1 month for BP medication recheck. Reasons to follow-up sooner or seek emergent care reviewed.     Cindy Aleman PA-C  [facility]       Subjective     Hetal Poole is a 34 year  old female who presents to clinic today for the following health issues:    HPI   Chief Complaint   Patient presents with    Recheck Medication     Refills, no concerns    Consult     Ultrasound consult on results      Hetal presents for a medication recheck and follow up on pelvic ultrasound results.     -Hypertension: Was last seen on 25 where she was restarted on Amlodipine. Was told to start with 5 mg dose for first week and then take 10 mg however has mostly only been taking 5 mg daily. Notes BP at home has been around 120's/80's and other times is still reaching the 150's/90's range. Denies any side effects of medication along with no chest pain or SOB. Is also still taking Losartan-hydrochlorothiazide 100-25 mg daily.     -PAIGE/depression/PTSD: At last visit was also started on Sertraline 50 mg daily and Hydroxyzine 25 mg PRN for anxiety. States she has been only taking Sertraline 2-3 times a week, denies any side effects, has not taken Hydroxyzine. Denies any SI/HI, and feels Sertraline has helped her feel less anxious/depressed.     -Pelvic pain: Had pelvic ultrasound recently completed at [facility] which showed a possible seroma in the region of the  scar. Also was found to have a lesion on her right ovary, radiologist recommended she have pelvic MRI completed.     Daily medications reviewed.       Objective    /89 (BP Location: Right arm, Patient Position: Chair, Cuff Size: Adult Large)   Pulse 100   Temp 97.5  F (36.4  C) (Temporal)   Wt 83 kg (183 lb)   LMP 2025 (Exact Date)   SpO2 99%   BMI 31.41 kg/m    Body mass index is 31.41 kg/m .    Physical Examination:  GENERAL: healthy, alert and no distress  EYES: Eyes grossly normal to inspection, PERRL and conjunctivae and sclerae normal  HENT: mouth without ulcers or lesions  NECK: no adenopathy, no asymmetry, masses, or scars and thyroid normal to palpation  RESP: lungs clear to auscultation - no rales, rhonchi or  wheezes  CV: regular rate and rhythm, normal S1 S2, no S3 or S4, no murmur, click or rub, no peripheral edema   MS: no gross musculoskeletal defects noted, no edema  SKIN: no suspicious lesions or rashes  PSYCH: mentation appears normal, affect normal/bright

## 2025-03-11 NOTE — NURSING NOTE
Chief Complaint   Patient presents with    Recheck Medication     Refills, no concerns    Consult     Ultrasound consult on results     Pre-visit Screening:  Immunizations:  up to date  Colonoscopy:  na  Mammogram: na  Asthma Action Test/Plan:  na  PHQ9:  given today  GAD7:  given today  Questioned patient about current smoking habits Pt. has never smoked.  Ok to leave detailed message on voice mail for today's visit only yes, phone # [phone] (home)

## 2025-04-01 ENCOUNTER — TELEPHONE (OUTPATIENT)
Dept: FAMILY MEDICINE | Facility: CLINIC | Age: 35
End: 2025-04-01

## 2025-04-01 DIAGNOSIS — R10.2 CHRONIC PELVIC PAIN IN FEMALE: Primary | ICD-10-CM

## 2025-04-01 DIAGNOSIS — Z98.891 HISTORY OF C-SECTION: ICD-10-CM

## 2025-04-01 DIAGNOSIS — G89.29 CHRONIC PELVIC PAIN IN FEMALE: Primary | ICD-10-CM

## 2025-04-01 NOTE — TELEPHONE ENCOUNTER
Called Janie Mojica  to relay patient's recent MRI pelvis results. Discussed no masses on right ovary, lesion on pelvic ultrasound was due to an adjacent bowel loop or blood vessel. Discussed she does have a small fluid collection/seroma in her  scar niche, which discussed possibly may be contributing to her pain in that area. She also has some other post surgical changes of her c-sections which I recommend she follow with a OBGYN specialist for further evaluation and management, referral placed to OBGYN specialists in [place]. Janie will let me know if patient has any questions or concerns.     Cindy Aleman PA-C  [facility]

## 2025-04-15 ENCOUNTER — OFFICE VISIT (OUTPATIENT)
Dept: FAMILY MEDICINE | Facility: CLINIC | Age: 35
End: 2025-04-15

## 2025-04-15 VITALS
TEMPERATURE: 98.1 F | SYSTOLIC BLOOD PRESSURE: 138 MMHG | BODY MASS INDEX: 31.7 KG/M2 | OXYGEN SATURATION: 99 % | DIASTOLIC BLOOD PRESSURE: 92 MMHG | HEART RATE: 95 BPM | WEIGHT: 184.7 LBS

## 2025-04-15 DIAGNOSIS — I10 ESSENTIAL HYPERTENSION, BENIGN: Primary | ICD-10-CM

## 2025-04-15 DIAGNOSIS — F33.0 MILD EPISODE OF RECURRENT MAJOR DEPRESSIVE DISORDER: ICD-10-CM

## 2025-04-15 DIAGNOSIS — B07.8 OTHER VIRAL WARTS: ICD-10-CM

## 2025-04-15 DIAGNOSIS — F41.1 GENERALIZED ANXIETY DISORDER: ICD-10-CM

## 2025-04-15 DIAGNOSIS — F43.10 PTSD (POST-TRAUMATIC STRESS DISORDER): ICD-10-CM

## 2025-04-15 PROCEDURE — 99214 OFFICE O/P EST MOD 30 MIN: CPT

## 2025-04-15 PROCEDURE — 96127 BRIEF EMOTIONAL/BEHAV ASSMT: CPT

## 2025-04-15 PROCEDURE — G2211 COMPLEX E/M VISIT ADD ON: HCPCS

## 2025-04-15 RX ORDER — CARVEDILOL 6.25 MG/1
6.25 TABLET ORAL 2 TIMES DAILY WITH MEALS
Qty: 60 TABLET | Refills: 0 | Status: SHIPPED | OUTPATIENT
Start: 2025-04-15 | End: 2025-05-15

## 2025-04-15 RX ORDER — AMLODIPINE BESYLATE 10 MG/1
10 TABLET ORAL DAILY
Qty: 30 TABLET | Refills: 0 | Status: SHIPPED | OUTPATIENT
Start: 2025-04-15

## 2025-04-15 ASSESSMENT — PATIENT HEALTH QUESTIONNAIRE - PHQ9
5. POOR APPETITE OR OVEREATING: NOT AT ALL
SUM OF ALL RESPONSES TO PHQ QUESTIONS 1-9: 0

## 2025-04-15 ASSESSMENT — ANXIETY QUESTIONNAIRES
6. BECOMING EASILY ANNOYED OR IRRITABLE: NOT AT ALL
2. NOT BEING ABLE TO STOP OR CONTROL WORRYING: NOT AT ALL
GAD7 TOTAL SCORE: 0
IF YOU CHECKED OFF ANY PROBLEMS ON THIS QUESTIONNAIRE, HOW DIFFICULT HAVE THESE PROBLEMS MADE IT FOR YOU TO DO YOUR WORK, TAKE CARE OF THINGS AT HOME, OR GET ALONG WITH OTHER PEOPLE: NOT DIFFICULT AT ALL
5. BEING SO RESTLESS THAT IT IS HARD TO SIT STILL: NOT AT ALL
3. WORRYING TOO MUCH ABOUT DIFFERENT THINGS: NOT AT ALL
7. FEELING AFRAID AS IF SOMETHING AWFUL MIGHT HAPPEN: NOT AT ALL
GAD7 TOTAL SCORE: 0
1. FEELING NERVOUS, ANXIOUS, OR ON EDGE: NOT AT ALL

## 2025-04-15 NOTE — PROGRESS NOTES
Assessment & Plan     1. Essential hypertension, benign (Primary)  - Not well controlled and at goal of being <130/80, will add in Carvedilol 6.25 mg BID for the next month and have Hetal continue Amlodipine 10 mg and Losartan-hydrochlorothiazide 100-25 mg daily. Discussed not taking another tablet of Amlodipine when her BP is so high as the maximum dose is 10 mg daily, she demonstrated understanding. Side effects of Carvedilol reviewed. Also recommend Hetal have a consult with cardiology regarding her treatment resistant hypertension, referral placed to [facility]. She will continue to check her BP daily at home and keep a log of her values. Will follow up with me in one month for a recheck.   - amLODIPine (NORVASC) 10 MG tablet; Take 1 tablet (10 mg) by mouth daily.  Dispense: 30 tablet; Refill: 0  - carvedilol (COREG) 6.25 MG tablet; Take 1 tablet (6.25 mg) by mouth 2 times daily (with meals).  Dispense: 60 tablet; Refill: 0  - Adult Cardiology Raúl Price Referral - To [facility] Location; Future    2. PTSD (post-traumatic stress disorder)  - Well controlled off of medications, will plan to monitor and have Hetal continue weekly therapy appointments.     3. Generalized anxiety disorder  - Will plan for Hetal to continue Hydroxyzine 25 mg PRN for anxiety and have her continue to stay off of Sertraline as she does not want to take a daily medication and feels her anxiety and depression are both well controlled. She will continue weekly therapy and she denies any SI/HI. She will follow up sooner if any concerns with her anxiety/depression.     4. Mild episode of recurrent major depressive disorder  - Well controlled off of medications, will plan to monitor and have Hetal continue weekly therapy appointments.     5. Other viral warts  - Discussed viral nature of warts and that we can plan to do a repeat cryotherapy at her next follow up visit in one month.     Follow up in 1 month for a medication recheck  and with cardiology when able to schedule. Reasons to follow-up sooner or seek emergent care reviewed.     Cindy Aleman PA-C  [facility]       Subjective     Hetal Poole is a 34 year old female who presents to clinic today for the following health issues:    HPI   Chief Complaint   Patient presents with    Recheck Medication     Non-fasting med check and refill, doing well on both meds    Consult     Review MRI - already talked to Bushra Fong presents with Kazakh  Janie for a medication recheck. She was last seen on 03/11/25, please see that note for further details.      -Hypertension: At last visit had dose of Amlodipine increased from 5 mg to 10 mg daily which she has been taking consistently every morning. Is also taking Losartan-hydrochlorothiazide 100-25 mg every morning. Notes her BP at home has still been in the 130-140/80-90 range but has still been noticing that in the evenings it will go up to the 160's/90's range and during this time will take another Amlodipine when her blood pressure is so high. She denies any side effects of her medications along with no symptoms of chest pain, SOB, palpitations, or any swelling in her legs.     -PAIGE/depression/PTSD: Currently taking Hydroxyzine 25 mg PRN for anxiety. Has not taken Sertraline in over a week as she does not want to get addicted to any medications. Denies any SI/HI or any panic attacks. Is continuing to see her therapist weekly.      -Warts: Has a history of two warts on the dorsal side of her right hand for the last 2 years, notes she saw a dermatologist about 6 months ago and had one treatment with cryotherapy but this did not help. Wondering if she can have this done again.     Daily medications reviewed.      Objective    BP (!) 138/92 (BP Location: Right arm, Patient Position: Sitting, Cuff Size: Adult Large)   Pulse 95   Temp 98.1  F (36.7  C) (Temporal)   Wt 83.8 kg (184 lb 11.2 oz)   LMP  04/14/2025 (Exact Date)   SpO2 99%   BMI 31.70 kg/m    Body mass index is 31.7 kg/m .    Physical Examination:  GENERAL: healthy, alert and no distress  EYES: Eyes grossly normal to inspection, PERRL and conjunctivae and sclerae normal  HENT: mouth without ulcers or lesions  NECK: no adenopathy, no asymmetry, masses, or scars and thyroid normal to palpation  RESP: lungs clear to auscultation - no rales, rhonchi or wheezes  CV: regular rate and rhythm, normal S1 S2, no S3 or S4, no murmur, click or rub, no peripheral edema   ABDOMEN: soft and non-tender  MS: no gross musculoskeletal defects noted, no edema  SKIN: two warts noted on dorsal aspect of right hand, one near base of thumb area and the other in between fourth and fifth digits, otherwise no suspicious lesions or rashes  PSYCH: mentation appears normal, affect normal/bright    Labs reviewed.        2/13/2025     4:07 PM 3/11/2025     4:53 PM 4/15/2025     4:11 PM   PHQ   PHQ-9 Total Score 1 0 0   Q9: Thoughts of better off dead/self-harm past 2 weeks Not at all Not at all Not at all          2/13/2025     4:07 PM 3/11/2025     4:53 PM 4/15/2025     4:11 PM   PAIGE-7 SCORE   Total Score 0 0 0

## 2025-04-15 NOTE — NURSING NOTE
Chief Complaint   Patient presents with    Recheck Medication     Non-fasting med check and refill, doing well on both meds    Consult     Review MRI - already talked to Bushra     Pre-visit Screening:  Immunizations:  up to date  Colonoscopy:  na  Mammogram: na  Asthma Action Test/Plan:  na  PHQ9:  given  GAD7:  given   Questioned patient about current smoking habits Pt. has never smoked.  Ok to leave detailed message on voice mail for today's visit only yes, phone # [phone] (home)

## 2025-05-13 DIAGNOSIS — I10 ESSENTIAL HYPERTENSION, BENIGN: ICD-10-CM

## 2025-05-13 RX ORDER — AMLODIPINE BESYLATE 10 MG/1
10 TABLET ORAL DAILY
COMMUNITY
Start: 2025-05-13

## 2025-05-13 RX ORDER — CARVEDILOL 6.25 MG/1
6.25 TABLET ORAL 2 TIMES DAILY WITH MEALS
COMMUNITY
Start: 2025-05-13

## 2025-05-13 NOTE — TELEPHONE ENCOUNTER
Hetal Poole is requesting a refill of:    Refused Prescriptions:                       Disp   Refills    carvedilol (COREG) 6.25 MG tablet [Pharmac*                Sig: TAKE 1 TABLET BY MOUTH TWICE A DAY WITH MEALS  Refused By: CAMMIE RICE  Reason for Refusal: Patient needs appointment    amLODIPine (NORVASC) 10 MG tablet [Pharmac*                Sig: TAKE 1 TABLET (10 MG) BY MOUTH DAILY.  Refused By: CAMMIE RICE  Reason for Refusal: Patient needs appointment    Will refill at pt's appt today

## 2025-06-03 ENCOUNTER — OFFICE VISIT (OUTPATIENT)
Dept: FAMILY MEDICINE | Facility: CLINIC | Age: 35
End: 2025-06-03

## 2025-06-03 VITALS
SYSTOLIC BLOOD PRESSURE: 142 MMHG | OXYGEN SATURATION: 99 % | WEIGHT: 190.6 LBS | TEMPERATURE: 97.3 F | DIASTOLIC BLOOD PRESSURE: 90 MMHG | HEIGHT: 64 IN | HEART RATE: 89 BPM | BODY MASS INDEX: 32.54 KG/M2

## 2025-06-03 DIAGNOSIS — I10 ESSENTIAL HYPERTENSION, BENIGN: ICD-10-CM

## 2025-06-03 DIAGNOSIS — G89.29 CHRONIC PELVIC PAIN IN FEMALE: ICD-10-CM

## 2025-06-03 DIAGNOSIS — F33.0 MILD EPISODE OF RECURRENT MAJOR DEPRESSIVE DISORDER: ICD-10-CM

## 2025-06-03 DIAGNOSIS — F43.10 PTSD (POST-TRAUMATIC STRESS DISORDER): ICD-10-CM

## 2025-06-03 DIAGNOSIS — E78.2 MIXED HYPERLIPIDEMIA: ICD-10-CM

## 2025-06-03 DIAGNOSIS — R10.2 CHRONIC PELVIC PAIN IN FEMALE: ICD-10-CM

## 2025-06-03 DIAGNOSIS — F41.1 GENERALIZED ANXIETY DISORDER: ICD-10-CM

## 2025-06-03 DIAGNOSIS — Z86.19 HISTORY OF COLD SORES: ICD-10-CM

## 2025-06-03 DIAGNOSIS — Z01.818 PRE-OPERATIVE EXAMINATION: Primary | ICD-10-CM

## 2025-06-03 LAB
BUN SERPL-MCNC: 16 MG/DL (ref 7–25)
BUN/CREATININE RATIO: 14 (ref 6–32)
CALCIUM SERPL-MCNC: 10 MG/DL (ref 8.6–10.3)
CHLORIDE SERPLBLD-SCNC: 101.9 MMOL/L (ref 98–110)
CO2 SERPL-SCNC: 27.5 MMOL/L (ref 20–32)
CREAT SERPL-MCNC: 1.11 MG/DL (ref 0.6–1.3)
ERYTHROCYTE [DISTWIDTH] IN BLOOD BY AUTOMATED COUNT: 11.8 %
GLUCOSE SERPL-MCNC: 85 MG/DL (ref 60–99)
HCT VFR BLD AUTO: 42.1 % (ref 35–47)
HEMOGLOBIN: 13.5 G/DL (ref 11.7–15.7)
MCH RBC QN AUTO: 31.6 PG (ref 26–33)
MCHC RBC AUTO-ENTMCNC: 32.1 G/DL (ref 31–36)
MCV RBC AUTO: 98.6 FL (ref 78–100)
PLATELET COUNT - QUEST: 306 10^9/L (ref 150–375)
POTASSIUM SERPL-SCNC: 4.29 MMOL/L (ref 3.5–5.3)
RBC # BLD AUTO: 4.27 10*12/L (ref 3.8–5.2)
SODIUM SERPL-SCNC: 139.1 MMOL/L (ref 135–146)
WBC # BLD AUTO: 7.1 10*9/L (ref 4–11)

## 2025-06-03 PROCEDURE — 80048 BASIC METABOLIC PNL TOTAL CA: CPT

## 2025-06-03 PROCEDURE — 36415 COLL VENOUS BLD VENIPUNCTURE: CPT

## 2025-06-03 PROCEDURE — G2211 COMPLEX E/M VISIT ADD ON: HCPCS

## 2025-06-03 PROCEDURE — 85027 COMPLETE CBC AUTOMATED: CPT

## 2025-06-03 PROCEDURE — 99214 OFFICE O/P EST MOD 30 MIN: CPT | Mod: 25

## 2025-06-03 RX ORDER — AMLODIPINE BESYLATE 10 MG/1
10 TABLET ORAL DAILY
Qty: 90 TABLET | Refills: 0 | Status: SHIPPED | OUTPATIENT
Start: 2025-06-03

## 2025-06-03 RX ORDER — AMLODIPINE BESYLATE 10 MG/1
10 TABLET ORAL DAILY
Qty: 30 TABLET | Refills: 0 | Status: SHIPPED | OUTPATIENT
Start: 2025-06-03 | End: 2025-06-03

## 2025-06-03 RX ORDER — VALACYCLOVIR HYDROCHLORIDE 1 G/1
2000 TABLET, FILM COATED ORAL 2 TIMES DAILY
Qty: 20 TABLET | Refills: 0 | Status: SHIPPED | OUTPATIENT
Start: 2025-06-03

## 2025-06-03 RX ORDER — CARVEDILOL 6.25 MG/1
6.25 TABLET ORAL 2 TIMES DAILY WITH MEALS
Qty: 180 TABLET | Refills: 0 | Status: SHIPPED | OUTPATIENT
Start: 2025-06-03

## 2025-06-03 NOTE — PROGRESS NOTES
Assessment & Plan     No diagnosis found.     There are no Patient Instructions on file for this visit.     Reasons to follow-up sooner or seek emergent care reviewed.     >*** minutes spent on the date of the encounter doing chart review, history and exam, documentation and further activities per the note    Cindy Aleman PA-C  [facility]       Subjective     Hetal Poole is a 34 year old female who presents to clinic today for the following health issues:    HPI   No chief complaint on file.     {SUPERLIST (Optional):279079}  {additonal problems for provider to add (Optional):574748}        Objective    LMP 04/14/2025 (Exact Date)   There is no height or weight on file to calculate BMI.    Physical Examination:  GENERAL: healthy, alert and no distress  EYES: Eyes grossly normal to inspection, PERRL and conjunctivae and sclerae normal  HENT: ear canals and TM's normal, nose and mouth without ulcers or lesions  NECK: no adenopathy, no asymmetry, masses, or scars and thyroid normal to palpation  RESP: lungs clear to auscultation - no rales, rhonchi or wheezes  CV: regular rate and rhythm, normal S1 S2, no S3 or S4, no murmur, click or rub, no peripheral edema   ABDOMEN: soft, nontender, no hepatosplenomegaly, no masses and bowel sounds normal  MS: no gross musculoskeletal defects noted, no edema  SKIN: no suspicious lesions or rashes  NEURO: Normal strength and tone, mentation intact and speech normal  PSYCH: mentation appears normal, affect normal/bright    Labs reviewed.  {Diagnostic Test Results (Optional):541685}

## 2025-06-03 NOTE — NURSING NOTE
Chief Complaint   Patient presents with    Pre-Op Exam     DOS 6/16/25 Laparoscopy at [facility] done by Dr. Shook

## 2025-06-03 NOTE — PROGRESS NOTES
Preoperative Evaluation  [facility]  [address]  [address]  [facility] [address]  Phone: [phone]  Fax: [phone]  Primary Provider: Cindy Aleman PA-C  Pre-op Performing Provider: Cindy Aleman PA-C  Chris 3, 2025         6/3/2025   Surgical Information   What procedure is being done? Laparoscopy with lysis of adhesions   Facility or Hospital where procedure/surgery will be performed: [facility]   Who is doing the procedure / surgery? Dr. Shook   Date of surgery / procedure: 6/16/25   Time of surgery / procedure: 9:45 am   Where do you plan to recover after surgery? at home with family     Fax number for surgical facility: [phone]    Assessment & Plan     The proposed surgical procedure is considered INTERMEDIATE risk.    Pre-operative examination  - Patient is cleared for surgery. CBC and BMP are within normal limits.   - VENOUS COLLECTION  - Hemogram Platelet (BFP)  - Basic Metabolic Panel (BFP)    Chronic pelvic pain in female    Essential hypertension, benign  - Stable around 140/90 range, patient at this time declines seeing cardiology as she notes at home has been in the 120-140/80's range, she will plan to follow up in July and in August for a BP medication recheck at that time, cleared for surgery.   - VENOUS COLLECTION  - Basic Metabolic Panel (BFP)  - carvedilol (COREG) 6.25 MG tablet; Take 1 tablet (6.25 mg) by mouth 2 times daily (with meals).  - amLODIPine (NORVASC) 10 MG tablet; Take 1 tablet (10 mg) by mouth daily.    Generalized anxiety disorder  - Well controlled.     Mild episode of recurrent major depressive disorder  - Well controlled.     PTSD (post-traumatic stress disorder)  - Well controlled.     History of cold sores  - Well controlled.   - valACYclovir (VALTREX) 1000 mg tablet; Take 2 tablets (2,000 mg) by mouth 2 times daily. For one day at onset of cold sores as needed    Mixed hyperlipidemia  - Stable.     Antiplatelet or  Anticoagulation Medication Instructions   - We reviewed the medication list and the patient is not on an antiplatelet or anticoagulation medications.    Additional Medication Instructions  - Take all scheduled medications on the day of surgery.     Recommendation  - Approval given to proceed with proposed procedure, without further diagnostic evaluation.    Subjective   Hetal is a 34 year old, presenting for the following:  Pre-Op Exam (DOS 6/16/25 Laparoscopy at [facility] done by Dr. Shook)    Hetal presents today with Somali  Janie for a preoperative examination for an upcoming laparoscopy at [facility] with Dr. Shook on 06/16/25. She has had a history of chronic pelvic pain, had a pelvic MRI which showed adhesions as the primary cause of her pain after she had her two c-sections back in [place].     Medical history and daily medications reviewed.     HPI:        6/3/2025   Pre-Op Questionnaire   Have you ever had a heart attack or stroke? No   Have you ever had surgery on your heart or blood vessels, such as a stent placement, a coronary artery bypass, or surgery on an artery in your head, neck, heart, or legs? No   Do you have chest pain with activity? No   Do you have a history of heart failure? No   Do you currently have a cold, bronchitis or symptoms of other infection? No   Do you have a cough, shortness of breath, or wheezing? No   Do you or anyone in your family have previous history of blood clots? No   Do you or does anyone in your family have a serious bleeding problem such as prolonged bleeding following surgeries or cuts? No   Have you ever had problems with anemia or been told to take iron pills? No   Have you had any abnormal blood loss such as black, tarry or bloody stools, or abnormal vaginal bleeding? No   Have you ever had a blood transfusion? No   Are you willing to have a blood transfusion if it is medically needed before, during, or after your surgery?  Yes   Have you or any of your relatives ever had problems with anesthesia? No   Do you have sleep apnea, excessive snoring or daytime drowsiness? No   Do you have any artifical heart valves or other implanted medical devices like a pacemaker, defibrillator, or continuous glucose monitor? No   Do you have artificial joints? No   Are you allergic to latex? No     Advance Care Planning  Discussed advance care planning with patient; however, patient declined at this time.    Preoperative Review of    reviewed - no record of controlled substances prescribed.    Status of Chronic Conditions:  See problem list for active medical problems.  Problems all longstanding and stable, except as noted/documented.  See ROS for pertinent symptoms related to these conditions.    Patient Active Problem List    Diagnosis Date Noted    PTSD (post-traumatic stress disorder) 2025     Priority: Medium    Generalized anxiety disorder 2025     Priority: Medium    Mild episode of recurrent major depressive disorder 2025     Priority: Medium    History of cold sores 2024     Priority: Medium    Mixed hyperlipidemia 2024     Priority: Medium    Essential hypertension, benign 2023     Priority: Medium    ACP (advance care planning) 10/03/2023     Priority: Medium      Past Medical History:   Diagnosis Date    History of pre-eclampsia 2016    Noted in both of her pregnancies (, ), had c-sections     Past Surgical History:   Procedure Laterality Date    BUNIONECTOMY Right 10/19/2023    BUNIONECTOMY Left 2024     SECTION  03/15/2016    Had elevated blood pressure during her pregnancy     SECTION N/A 2019    Had elevated blood pressure during her pregnancy     Current Outpatient Medications   Medication Sig Dispense Refill    amLODIPine (NORVASC) 10 MG tablet Take 1 tablet (10 mg) by mouth daily. 90 tablet 0    carvedilol (COREG) 6.25 MG tablet Take 1 tablet (6.25 mg) by  mouth 2 times daily (with meals). 180 tablet 0    hydrOXYzine HCl (ATARAX) 25 MG tablet Take 0.5-1 tablets (12.5-25 mg) by mouth 3 times daily as needed for anxiety or other (sleep). 60 tablet 1    losartan-hydrochlorothiazide (HYZAAR) 100-25 MG tablet Take 1 tablet by mouth daily. 90 tablet 1    valACYclovir (VALTREX) 1000 mg tablet Take 2 tablets (2,000 mg) by mouth 2 times daily. For one day at onset of cold sores as needed 20 tablet 0     No Known Allergies     Social History     Tobacco Use    Smoking status: Never     Passive exposure: Never    Smokeless tobacco: Never   Substance Use Topics    Alcohol use: Not Currently     Alcohol/week: 0.0 - 1.0 standard drinks of alcohol     Comment: rare, on special occasions     Family History   Problem Relation Age of Onset    Hypertension Mother     Chronic Obstructive Pulmonary Disease Father          at 57 from COPD    Hypertension Sister     Cerebrovascular Disease Maternal Grandfather     No Known Problems Daughter     No Known Problems Daughter     Cerebrovascular Disease Maternal Aunt     Hypertension Maternal Aunt     Breast Cancer No family hx of     Ovarian Cancer No family hx of     Myocardial Infarction No family hx of      History   Drug Use Unknown     Review of Systems  CONSTITUTIONAL: NEGATIVE for fever, chills, change in weight  INTEGUMENTARY/SKIN: NEGATIVE for worrisome rashes, moles or lesions  EYES: NEGATIVE for vision changes or irritation  ENT/MOUTH: NEGATIVE for ear, mouth and throat problems  RESP: NEGATIVE for significant cough or SOB  CV: NEGATIVE for chest pain, palpitations or peripheral edema  GI: NEGATIVE for nausea, abdominal pain, heartburn, or change in bowel habits  : NEGATIVE for frequency, dysuria, or hematuria  MUSCULOSKELETAL: NEGATIVE for significant arthralgias or myalgia  NEURO: NEGATIVE for weakness, dizziness or paresthesias  ENDOCRINE: NEGATIVE for temperature intolerance, skin/hair changes  HEME: NEGATIVE for bleeding  "problems  PSYCHIATRIC: NEGATIVE for changes in mood or affect    Objective    BP (!) 142/90 (BP Location: Right arm, Patient Position: Sitting, Cuff Size: Adult Large)   Pulse 89   Temp 97.3  F (36.3  C) (Temporal)   Ht 1.626 m (5' 4\")   Wt 86.5 kg (190 lb 9.6 oz)   LMP 05/13/2025 (Exact Date)   SpO2 99%   BMI 32.72 kg/m     Estimated body mass index is 32.72 kg/m  as calculated from the following:    Height as of this encounter: 1.626 m (5' 4\").    Weight as of this encounter: 86.5 kg (190 lb 9.6 oz).    Physical Exam  GENERAL: alert and no distress  EYES: Eyes grossly normal to inspection, PERRL and conjunctivae and sclerae normal  HENT: ear canals and TM's normal, nose and mouth without ulcers or lesions  NECK: no adenopathy, no asymmetry, masses, or scars  RESP: lungs clear to auscultation - no rales, rhonchi or wheezes  CV: regular rate and rhythm, normal S1 S2, no S3 or S4, no murmur, click or rub, no peripheral edema  ABDOMEN: soft, nontender, no hepatosplenomegaly, no masses and bowel sounds normal  MS: no gross musculoskeletal defects noted, no edema  SKIN: no suspicious lesions or rashes  NEURO: Normal strength and tone, mentation intact and speech normal  PSYCH: mentation appears normal, affect normal/bright    Recent Labs   Lab Test 02/13/25  0000   .5   POTASSIUM 4.33   CR 0.92      Diagnostics  Recent Results (from the past week)   Hemogram Platelet (BFP)    Collection Time: 06/03/25  3:09 PM   Result Value Ref Range    WBC 7.1 4.0 - 11 10*9/L    RBC Count 4.27 3.8 - 5.2 10*12/L    Hemoglobin 13.5 11.7 - 15.7 g/dL    Hematocrit 42.1 35.0 - 47.0 %    MCV 98.6 78 - 100 fL    MCH 31.6 26 - 33 pg    MCHC 32.1 31 - 36 g/dL    RDW 11.8 %    Platelet Count 306 150 - 375 10^9/L   Basic Metabolic Panel (BFP)    Collection Time: 06/03/25  5:25 PM   Result Value Ref Range    Carbon Dioxide 27.5 20 - 32 mmol/L    Creatinine 1.11 0.60 - 1.30 mg/dL    Glucose 85 60 - 99 mg/dL    Sodium 139.1 135 - 146 " mmol/L    Potassium 4.29 3.5 - 5.3 mmol/L    Chloride 101.9 98 - 110 mmol/L    Urea Nitrogen 16 7 - 25 mg/dL    Calcium 10.0 8.6 - 10.3 mg/dL    BUN/Creatinine Ratio 14 6 - 32      No EKG required, no history of coronary heart disease, significant arrhythmia, peripheral arterial disease or other structural heart disease.    Revised Cardiac Risk Index (RCRI)  The patient has the following serious cardiovascular risks for perioperative complications:   - No serious cardiac risks = 0 points     RCRI Interpretation: 0 points: Class I (very low risk - 0.4% complication rate)    Signed Electronically by: Cindy Aleman PA-C  A copy of this evaluation report is provided to the requesting physician.

## 2025-06-16 ENCOUNTER — LAB REQUISITION (OUTPATIENT)
Dept: LAB | Facility: CLINIC | Age: 35
End: 2025-06-16
Payer: COMMERCIAL

## 2025-06-16 DIAGNOSIS — R10.2 PELVIC AND PERINEAL PAIN: ICD-10-CM

## 2025-06-16 PROCEDURE — 88305 TISSUE EXAM BY PATHOLOGIST: CPT | Mod: 26 | Performed by: PATHOLOGY

## 2025-06-16 PROCEDURE — 88305 TISSUE EXAM BY PATHOLOGIST: CPT | Mod: TC,ORL | Performed by: OBSTETRICS & GYNECOLOGY

## 2025-06-18 LAB
PATH REPORT.COMMENTS IMP SPEC: NORMAL
PATH REPORT.FINAL DX SPEC: NORMAL
PATH REPORT.GROSS SPEC: NORMAL
PATH REPORT.MICROSCOPIC SPEC OTHER STN: NORMAL
PATH REPORT.RELEVANT HX SPEC: NORMAL
PHOTO IMAGE: NORMAL

## 2025-07-25 ENCOUNTER — OFFICE VISIT (OUTPATIENT)
Dept: FAMILY MEDICINE | Facility: CLINIC | Age: 35
End: 2025-07-25

## 2025-07-25 VITALS
HEART RATE: 88 BPM | BODY MASS INDEX: 32.65 KG/M2 | DIASTOLIC BLOOD PRESSURE: 90 MMHG | WEIGHT: 190.2 LBS | SYSTOLIC BLOOD PRESSURE: 122 MMHG | TEMPERATURE: 97.2 F | OXYGEN SATURATION: 97 %

## 2025-07-25 DIAGNOSIS — I10 ESSENTIAL HYPERTENSION, BENIGN: Primary | ICD-10-CM

## 2025-07-25 DIAGNOSIS — E66.09 CLASS 1 OBESITY DUE TO EXCESS CALORIES WITH SERIOUS COMORBIDITY AND BODY MASS INDEX (BMI) OF 32.0 TO 32.9 IN ADULT: ICD-10-CM

## 2025-07-25 DIAGNOSIS — E66.811 CLASS 1 OBESITY DUE TO EXCESS CALORIES WITH SERIOUS COMORBIDITY AND BODY MASS INDEX (BMI) OF 32.0 TO 32.9 IN ADULT: ICD-10-CM

## 2025-07-25 LAB
ALBUMIN SERPL-MCNC: 4.5 G/DL (ref 3.6–5.1)
ALP SERPL-CCNC: 57 U/L (ref 33–130)
ALT 1742-6: 11 U/L (ref 0–32)
AST 1920-8: 10 U/L (ref 0–35)
BILIRUB SERPL-MCNC: 0.8 MG/DL (ref 0.2–1.2)
BUN SERPL-MCNC: 13 MG/DL (ref 7–25)
BUN/CREATININE RATIO: 16 (ref 6–32)
CALCIUM SERPL-MCNC: 9.5 MG/DL (ref 8.6–10.3)
CHLORIDE SERPLBLD-SCNC: 104.8 MMOL/L (ref 98–110)
CO2 SERPL-SCNC: 25.5 MMOL/L (ref 20–32)
CREAT SERPL-MCNC: 0.81 MG/DL (ref 0.6–1.3)
GLUCOSE SERPL-MCNC: 100 MG/DL (ref 60–99)
HEMOGLOBIN A1C: 5.4 % (ref 4–5.6)
POTASSIUM SERPL-SCNC: 4.52 MMOL/L (ref 3.5–5.3)
PROT SERPL-MCNC: 7.7 G/DL (ref 6.1–8.1)
SODIUM SERPL-SCNC: 138.9 MMOL/L (ref 135–146)

## 2025-07-25 PROCEDURE — 36415 COLL VENOUS BLD VENIPUNCTURE: CPT

## 2025-07-25 PROCEDURE — 83036 HEMOGLOBIN GLYCOSYLATED A1C: CPT

## 2025-07-25 PROCEDURE — 99214 OFFICE O/P EST MOD 30 MIN: CPT

## 2025-07-25 PROCEDURE — G2211 COMPLEX E/M VISIT ADD ON: HCPCS

## 2025-07-25 PROCEDURE — 80053 COMPREHEN METABOLIC PANEL: CPT

## 2025-07-25 RX ORDER — CARVEDILOL 6.25 MG/1
6.25 TABLET ORAL 2 TIMES DAILY WITH MEALS
Qty: 180 TABLET | Refills: 1 | Status: SHIPPED | OUTPATIENT
Start: 2025-07-25

## 2025-07-25 RX ORDER — AMLODIPINE BESYLATE 10 MG/1
10 TABLET ORAL DAILY
Qty: 90 TABLET | Refills: 1 | Status: SHIPPED | OUTPATIENT
Start: 2025-07-25

## 2025-07-25 RX ORDER — LOSARTAN POTASSIUM AND HYDROCHLOROTHIAZIDE 25; 100 MG/1; MG/1
1 TABLET ORAL DAILY
Qty: 90 TABLET | Refills: 1 | Status: SHIPPED | OUTPATIENT
Start: 2025-07-25

## 2025-07-26 LAB — TSH SERPL-ACNC: 1.75 MIU/L

## 2025-07-27 ENCOUNTER — TELEPHONE (OUTPATIENT)
Dept: FAMILY MEDICINE | Facility: CLINIC | Age: 35
End: 2025-07-27

## 2025-07-27 DIAGNOSIS — E66.811 CLASS 1 OBESITY DUE TO EXCESS CALORIES WITH SERIOUS COMORBIDITY AND BODY MASS INDEX (BMI) OF 32.0 TO 32.9 IN ADULT: Primary | ICD-10-CM

## 2025-07-27 DIAGNOSIS — E66.09 CLASS 1 OBESITY DUE TO EXCESS CALORIES WITH SERIOUS COMORBIDITY AND BODY MASS INDEX (BMI) OF 32.0 TO 32.9 IN ADULT: Primary | ICD-10-CM

## 2025-07-27 NOTE — TELEPHONE ENCOUNTER
[facility]ail Pharmacy Prior Authorization Team   Phone: [phone]      EPA DENIED: WAITING ON OUTCOME LETTER

## 2025-09-02 ENCOUNTER — OFFICE VISIT (OUTPATIENT)
Dept: FAMILY MEDICINE | Facility: CLINIC | Age: 35
End: 2025-09-02

## 2025-09-02 VITALS
OXYGEN SATURATION: 99 % | DIASTOLIC BLOOD PRESSURE: 80 MMHG | HEART RATE: 80 BPM | BODY MASS INDEX: 32.03 KG/M2 | TEMPERATURE: 97.2 F | SYSTOLIC BLOOD PRESSURE: 112 MMHG | WEIGHT: 186.6 LBS

## 2025-09-02 DIAGNOSIS — E66.09 CLASS 1 OBESITY DUE TO EXCESS CALORIES WITH SERIOUS COMORBIDITY AND BODY MASS INDEX (BMI) OF 32.0 TO 32.9 IN ADULT: ICD-10-CM

## 2025-09-02 DIAGNOSIS — E66.811 CLASS 1 OBESITY DUE TO EXCESS CALORIES WITH SERIOUS COMORBIDITY AND BODY MASS INDEX (BMI) OF 32.0 TO 32.9 IN ADULT: ICD-10-CM

## 2025-09-02 PROCEDURE — 99213 OFFICE O/P EST LOW 20 MIN: CPT | Performed by: PHYSICIAN ASSISTANT
